# Patient Record
Sex: MALE | Race: WHITE | NOT HISPANIC OR LATINO
[De-identification: names, ages, dates, MRNs, and addresses within clinical notes are randomized per-mention and may not be internally consistent; named-entity substitution may affect disease eponyms.]

---

## 2021-08-30 ENCOUNTER — APPOINTMENT (OUTPATIENT)
Dept: GASTROENTEROLOGY | Facility: CLINIC | Age: 26
End: 2021-08-30
Payer: COMMERCIAL

## 2021-08-30 VITALS
BODY MASS INDEX: 41.75 KG/M2 | SYSTOLIC BLOOD PRESSURE: 110 MMHG | WEIGHT: 315 LBS | DIASTOLIC BLOOD PRESSURE: 60 MMHG | TEMPERATURE: 97.8 F | HEIGHT: 73 IN | OXYGEN SATURATION: 98 % | HEART RATE: 83 BPM

## 2021-08-30 DIAGNOSIS — Z78.9 OTHER SPECIFIED HEALTH STATUS: ICD-10-CM

## 2021-08-30 DIAGNOSIS — Z72.0 TOBACCO USE: ICD-10-CM

## 2021-08-30 DIAGNOSIS — Z82.49 FAMILY HISTORY OF ISCHEMIC HEART DISEASE AND OTHER DISEASES OF THE CIRCULATORY SYSTEM: ICD-10-CM

## 2021-08-30 DIAGNOSIS — Z80.7 FAMILY HISTORY OF OTHER MALIGNANT NEOPLASMS OF LYMPHOID, HEMATOPOIETIC AND RELATED TISSUES: ICD-10-CM

## 2021-08-30 PROCEDURE — 99203 OFFICE O/P NEW LOW 30 MIN: CPT

## 2021-08-30 RX ORDER — METRONIDAZOLE 250 MG/1
250 TABLET ORAL EVERY 6 HOURS
Qty: 40 | Refills: 0 | Status: COMPLETED | COMMUNITY
Start: 2021-08-30 | End: 2021-09-09

## 2021-08-30 NOTE — REASON FOR VISIT
[Initial Evaluation] : an initial evaluation [FreeTextEntry1] : Left upper quadrant abdominal pain and diarrhea

## 2021-08-30 NOTE — REVIEW OF SYSTEMS
[Abdominal Pain] : abdominal pain [Vomiting] : no vomiting [Constipation] : no constipation [Diarrhea] : diarrhea [Heartburn] : heartburn [Negative] : Heme/Lymph

## 2021-08-30 NOTE — ASSESSMENT
[FreeTextEntry1] : Postinfectious irritable bowel syndrome.  Diarrhea may be related to amoxicillin.  Chronic heartburn with intermittent dysphagia suspected reflux esophagitis possible gastritis.  Patient is started on omeprazole 40 mg a day for 2-month.  Dicyclomine 20 mg twice a day for control of the left upper quadrant pain.  Metronidazole 250 mg 4 times a day for 10 days.  Patient will see me in 2 weeks if he still have the diarrhea then will do the blood work-up and stool examination for causes of chronic diarrhea.  Same way if he still complained of dysphagia then he will need upper endoscopy.

## 2021-08-30 NOTE — PHYSICAL EXAM
[General Appearance - Alert] : alert [General Appearance - In No Acute Distress] : in no acute distress [FreeTextEntry1] : Obese [Sclera] : the sclera and conjunctiva were normal [Extraocular Movements] : extraocular movements were intact [PERRL With Normal Accommodation] : pupils were equal in size, round, and reactive to light [Outer Ear] : the ears and nose were normal in appearance [Oropharynx] : the oropharynx was normal [Neck Appearance] : the appearance of the neck was normal [Neck Cervical Mass (___cm)] : no neck mass was observed [Jugular Venous Distention Increased] : there was no jugular-venous distention [Thyroid Diffuse Enlargement] : the thyroid was not enlarged [Thyroid Nodule] : there were no palpable thyroid nodules [Heart Rate And Rhythm] : heart rate was normal and rhythm regular [Auscultation Breath Sounds / Voice Sounds] : lungs were clear to auscultation bilaterally [Heart Sounds] : normal S1 and S2 [Heart Sounds Gallop] : no gallops [Murmurs] : no murmurs [Heart Sounds Pericardial Friction Rub] : no pericardial rub [Full Pulse] : the pedal pulses are present [Edema] : there was no peripheral edema [Breast Appearance] : normal in appearance [Breast Palpation Mass] : no palpable masses [Bowel Sounds] : normal bowel sounds [Abdomen Soft] : soft [Abdomen Tenderness] : non-tender [Abdomen Mass (___ Cm)] : no abdominal mass palpated [Cervical Lymph Nodes Enlarged Posterior Bilaterally] : posterior cervical [Cervical Lymph Nodes Enlarged Anterior Bilaterally] : anterior cervical [Axillary Lymph Nodes Enlarged Bilaterally] : axillary [Supraclavicular Lymph Nodes Enlarged Bilaterally] : supraclavicular [Femoral Lymph Nodes Enlarged Bilaterally] : femoral [Inguinal Lymph Nodes Enlarged Bilaterally] : inguinal [No CVA Tenderness] : no ~M costovertebral angle tenderness [No Spinal Tenderness] : no spinal tenderness [Abnormal Walk] : normal gait [Nail Clubbing] : no clubbing  or cyanosis of the fingernails [Musculoskeletal - Swelling] : no joint swelling seen [Motor Tone] : muscle strength and tone were normal [Skin Color & Pigmentation] : normal skin color and pigmentation [Skin Turgor] : normal skin turgor [] : no rash [Deep Tendon Reflexes (DTR)] : deep tendon reflexes were 2+ and symmetric [No Focal Deficits] : no focal deficits [Sensation] : the sensory exam was normal to light touch and pinprick [Oriented To Time, Place, And Person] : oriented to person, place, and time [Impaired Insight] : insight and judgment were intact [Affect] : the affect was normal

## 2021-08-30 NOTE — HISTORY OF PRESENT ILLNESS
[Nausea] : denies nausea [Constipation] : denies constipation [Yellow Skin Or Eyes (Jaundice)] : denies jaundice [Abdominal Swelling] : denies abdominal swelling [Rectal Pain] : denies rectal pain [Diarrhea] : diarrhea [Heartburn] : heartburn [Abdominal Pain] : abdominal pain [GERD] : gastroesophageal reflux disease [Wt Gain ___ Lbs] : no recent weight gain [Wt Loss ___ Lbs] : no recent weight loss [Hiatus Hernia] : no hiatus hernia [Peptic Ulcer Disease] : no peptic ulcer disease [Cholelithiasis] : no cholelithiasis [Pancreatitis] : no pancreatitis [Kidney Stone] : no kidney stone [Inflammatory Bowel Disease] : no inflammatory bowel disease [Irritable Bowel Syndrome] : no irritable bowel syndrome [Diverticulitis] : no diverticulitis [Alcohol Abuse] : no alcohol abuse [Malignancy] : no malignancy [Abdominal Surgery] : no abdominal surgery [Appendectomy] : no appendectomy [Cholecystectomy] : no cholecystectomy [de-identified] : About 2 months ago patient developed burning pain in left upper abdomen bowel movements were normal after 2 to 3 weeks he felt better.  On August 6, 2021 he returned back from the [place] 2 days later he started having diarrhea with crampy left abdominal pain he was given antibiotic with the diarrhea improved but still going about twice a day and his stool is not normal still has crampy left abdominal pain.  It prompted him to come to see me.\par \par Chronic heartburn with intermittent difficulty in swallowing for about a 2 months.  Epigastric pain burning in nature also about 2 months again intermittent.  No unexplained weight loss. [FreeTextEntry1] : Patient denies any history of hypertension, diabetes mellitus, MI, angina, CHF, TIA, CVA, asthma, arrhythmia patient is obese.

## 2021-09-23 ENCOUNTER — LABORATORY RESULT (OUTPATIENT)
Age: 26
End: 2021-09-23

## 2021-09-23 ENCOUNTER — APPOINTMENT (OUTPATIENT)
Dept: GASTROENTEROLOGY | Facility: CLINIC | Age: 26
End: 2021-09-23
Payer: COMMERCIAL

## 2021-09-23 VITALS
BODY MASS INDEX: 41.75 KG/M2 | HEART RATE: 73 BPM | HEIGHT: 73 IN | WEIGHT: 315 LBS | OXYGEN SATURATION: 98 % | SYSTOLIC BLOOD PRESSURE: 134 MMHG | DIASTOLIC BLOOD PRESSURE: 82 MMHG | TEMPERATURE: 97.7 F

## 2021-09-23 DIAGNOSIS — K21.00 GASTRO-ESOPHAGEAL REFLUX DISEASE WITH ESOPHAGITIS, WITHOUT BLEEDING: ICD-10-CM

## 2021-09-23 DIAGNOSIS — Z87.898 PERSONAL HISTORY OF OTHER SPECIFIED CONDITIONS: ICD-10-CM

## 2021-09-23 DIAGNOSIS — Z00.00 ENCOUNTER FOR GENERAL ADULT MEDICAL EXAMINATION W/OUT ABNORMAL FINDINGS: ICD-10-CM

## 2021-09-23 PROCEDURE — 99214 OFFICE O/P EST MOD 30 MIN: CPT | Mod: 25

## 2021-09-23 PROCEDURE — 36415 COLL VENOUS BLD VENIPUNCTURE: CPT

## 2021-09-23 NOTE — PHYSICAL EXAM
[General Appearance - In No Acute Distress] : in no acute distress [General Appearance - Alert] : alert [FreeTextEntry1] : Obese [Sclera] : the sclera and conjunctiva were normal [PERRL With Normal Accommodation] : pupils were equal in size, round, and reactive to light [Extraocular Movements] : extraocular movements were intact [Outer Ear] : the ears and nose were normal in appearance [Oropharynx] : the oropharynx was normal [Neck Appearance] : the appearance of the neck was normal [Neck Cervical Mass (___cm)] : no neck mass was observed [Jugular Venous Distention Increased] : there was no jugular-venous distention [Thyroid Diffuse Enlargement] : the thyroid was not enlarged [Thyroid Nodule] : there were no palpable thyroid nodules [Auscultation Breath Sounds / Voice Sounds] : lungs were clear to auscultation bilaterally [Heart Rate And Rhythm] : heart rate was normal and rhythm regular [Heart Sounds] : normal S1 and S2 [Heart Sounds Gallop] : no gallops [Murmurs] : no murmurs [Heart Sounds Pericardial Friction Rub] : no pericardial rub [Full Pulse] : the pedal pulses are present [Edema] : there was no peripheral edema [Breast Palpation Mass] : no palpable masses [Breast Appearance] : normal in appearance [Bowel Sounds] : normal bowel sounds [Abdomen Soft] : soft [Abdomen Tenderness] : non-tender [Abdomen Mass (___ Cm)] : no abdominal mass palpated [Cervical Lymph Nodes Enlarged Posterior Bilaterally] : posterior cervical [Cervical Lymph Nodes Enlarged Anterior Bilaterally] : anterior cervical [Supraclavicular Lymph Nodes Enlarged Bilaterally] : supraclavicular [Femoral Lymph Nodes Enlarged Bilaterally] : femoral [Axillary Lymph Nodes Enlarged Bilaterally] : axillary [Inguinal Lymph Nodes Enlarged Bilaterally] : inguinal [No CVA Tenderness] : no ~M costovertebral angle tenderness [No Spinal Tenderness] : no spinal tenderness [Abnormal Walk] : normal gait [Nail Clubbing] : no clubbing  or cyanosis of the fingernails [Musculoskeletal - Swelling] : no joint swelling seen [Motor Tone] : muscle strength and tone were normal [Skin Color & Pigmentation] : normal skin color and pigmentation [Skin Turgor] : normal skin turgor [] : no rash [Deep Tendon Reflexes (DTR)] : deep tendon reflexes were 2+ and symmetric [No Focal Deficits] : no focal deficits [Sensation] : the sensory exam was normal to light touch and pinprick [Oriented To Time, Place, And Person] : oriented to person, place, and time [Impaired Insight] : insight and judgment were intact [Affect] : the affect was normal

## 2021-09-23 NOTE — HISTORY OF PRESENT ILLNESS
[Heartburn] : denies heartburn [Vomiting] : denies vomiting [Nausea] : denies nausea [Diarrhea] : denies diarrhea [Constipation] : denies constipation [Yellow Skin Or Eyes (Jaundice)] : denies jaundice [Abdominal Swelling] : denies abdominal swelling [Rectal Pain] : denies rectal pain [Abdominal Pain] : abdominal pain [Wt Gain ___ Lbs] : no recent weight gain [Wt Loss ___ Lbs] : no recent weight loss [GERD] : no gastroesophageal reflux disease [Hiatus Hernia] : no hiatus hernia [Peptic Ulcer Disease] : no peptic ulcer disease [Pancreatitis] : no pancreatitis [Cholelithiasis] : no cholelithiasis [Inflammatory Bowel Disease] : no inflammatory bowel disease [Irritable Bowel Syndrome] : no irritable bowel syndrome [Diverticulitis] : no diverticulitis [Alcohol Abuse] : no alcohol abuse [Malignancy] : no malignancy [Abdominal Surgery] : no abdominal surgery [Appendectomy] : no appendectomy [Cholecystectomy] : no cholecystectomy [de-identified] : Left upper quadrant, localized burning pain.  Without any radiation to the back chest or lower abdomen.  Pain is intermittent no relieving or aggravating factor no relationship to the bowel movement.  No relationship to the food.  No dysuria or hematuria no history of fever or chills.  There is no relationship of this pain to deep breath or change in the position.  Patient denies any history of shingles.  He does give history of splenomegaly and renal infection and abnormal LFT few years ago the etiology of which was not clear.\par \par At present he denies any heartburn or dysphagia or epigastric pain or melena.  No unexplained weight loss.  His bowel movements are normal once a day or twice a day mostly normal formed stool at days the stool is loose.  The crampy abdominal pain and diarrhea he had has subsided.  Since on omeprazole he no longer have heartburn or dysphagia. [FreeTextEntry1] : No chronic illness

## 2021-09-23 NOTE — ASSESSMENT
[FreeTextEntry1] : Persistent left upper quadrant abdominal pain described as burning in nature intermittent very localized.  Physical examination is normal this pain has been present for last 14 months or so.\par \par His acute problems of gastroenteritis and reflux has improved on the medication and no longer have symptoms of GERD or acute gastroenteritis.  The left upper quadrant pain has not responded to the dicyclomine or omeprazole.  The nature of the symptoms are more suggestive of pain from neuritis.  Am requesting a CT abdomen and pelvis with p.o. and IV contrast to evaluate other possible causes of pain like pancreatic pathology, splenic infarct, and renal pathology.  Patient is started on amitriptyline 10 mg at bedtime for possible neuritis as the cause of pain.  Blood work-up, CBC, CMP, lipid profile, hemoglobin A1c and thyroid function and celiac markers were requested for health maintenance and to see if any etiology for the pain could be found.  I will see the patient in follow-up in 3 weeks to see the result of all this test and response to the treatment.

## 2021-09-23 NOTE — REASON FOR VISIT
"Chief Complaint   Patient presents with     Ent Problem     c/o swelling at left parotid gland  for about a week now- denies pain      History of Present Illness  Raji Jain is a 17 year old male who presents today for evaluation.  The patient reports developing some fullness/discoloration in the preauricular area about 1 week ago.  He may have cut himself shaving in the area around the time that this started.  Denies any redness or fluctuance.  Has no constitutional symptoms.  No pain in the area.  Mom feels like it slowly improving.  Mom did show me a picture of some discoloration area this seems to be improving.  The patient denies any facial numbness, weakness, history of skin cancer or any cancer.  The mass does not change with eating.  Denies dysphagia, odynophagia, pharyngodynia, otalgia, dysphonia, hemoptysis, other neck lumps, bumps, swelling, or unintentional weight loss.  The patient is a non-smoker.    Past Medical History  Patient Active Problem List   Diagnosis     Pes planus     Eczema     Wide-complex tachycardia (H)     Ventricular tachycardia (H)     SVT (supraventricular tachycardia) (H)     Vegetarian     Current Medications     Current Outpatient Medications:      Ferrous Sulfate (IRON PO), 1 tablet \" periodically \", Disp: , Rfl:      MULTIPLE VITAMIN CHEW   OR, one daily, Disp: , Rfl: 0    Allergies  No Known Allergies    Social History   Social History     Socioeconomic History     Marital status: Single     Spouse name: Not on file     Number of children: Not on file     Years of education: Not on file     Highest education level: Not on file   Occupational History     Not on file   Social Needs     Financial resource strain: Not on file     Food insecurity     Worry: Not on file     Inability: Not on file     Transportation needs     Medical: Not on file     Non-medical: Not on file   Tobacco Use     Smoking status: Never Smoker     Smokeless tobacco: Never Used     Tobacco comment: " No exposure   Substance and Sexual Activity     Alcohol use: No     Drug use: No     Sexual activity: Never   Lifestyle     Physical activity     Days per week: Not on file     Minutes per session: Not on file     Stress: Not on file   Relationships     Social connections     Talks on phone: Not on file     Gets together: Not on file     Attends [employer] service: Not on file     Active member of club or organization: Not on file     Attends meetings of clubs or organizations: Not on file     Relationship status: Not on file     Intimate partner violence     Fear of current or ex partner: Not on file     Emotionally abused: Not on file     Physically abused: Not on file     Forced sexual activity: Not on file   Other Topics Concern     Not on file   Social History Narrative     Not on file       Family History  Family History   Problem Relation Age of Onset     Heart Disease Maternal Grandfather      Diabetes Maternal Grandfather        Review of Systems  As per HPI and PMHx, otherwise 10+ comprehensive system review is negative.    Physical Exam  /61 (BP Location: Right arm, Patient Position: Chair, Cuff Size: Adult Regular)   Pulse 75   Temp 97.4  F (36.3  C) (Oral)   Wt 71.7 kg (158 lb)   BMI 22.04 kg/m    GENERAL: Patient is a pleasant, cooperative 17 year old male in no acute distress.  HEAD: Normocephalic, atraumatic.  Hair and scalp are normal.  EYES: Pupils are equal, round, reactive to light and accommodation.  Extraocular movements are intact.  The sclera nonicteric without injection.  The extraocular structures are normal.  EARS: Normal shape and symmetry.  No tenderness when palpating the mastoid or tragal areas bilaterally.  Patient does have some blackheads and possible bilaterally.   NOSE: Nares are patent.  Nasal mucosa is moist.  Negative anterior rhinoscopy.  ORAL CAVITY: Dentition is in good repair.  Mucous membranes are moist.  Tongue is mobile, protrudes to the midline.  Palate  elevates symmetrically.  No oral cavity or oropharyngeal masses, lesions, ulcerations, leukoplakia.  Good salivary flow through Stensen's ducts bilaterally.  NECK: Supple, trachea is midline.  Palpation of left preauricular area reveals some firmness/slight induration with some slight elevation you have purple color change is in front of the ear.  It appears to be adherent to the overlying skin.  Clinically, I think this is separate from parotid gland.  This extends from the process of the zygomatic bone down toward the ear lobule.  No tenderness. Palpation of the occipital, submental, submandibular, internal jugular chain, and supraclavicular nodes does not demonstrate any abnormal lymph nodes or masses bilaterally.  Palpation of the right parotid and bilateral submandibular areas reveals no masses.  No thyromegaly.    NEUROLOGIC: Cranial nerves II through XII are grossly intact.  Voice is strong.  Patient is House-Brackmann I/VI bilaterally.  CARDIOVASCULAR: Extremities are warm and well-perfused.  No significant peripheral edema.  RESPIRATORY: Patient has nonlabored breathing without cough, wheeze, stridor.  PSYCHIATRIC: Patient is alert and oriented.  Mood and affect appear normal.  SKIN: Warm and dry.  No scalp, face, or neck lesions noted.    Assessment and Plan     ICD-10-CM    1. Lesion of face  L98.9 US Head Neck Soft Tissue     It was my pleasure seeing aRji Jain today in clinic.  The patient has a use fullness in the left preauricular area.  This is likely a skin process given that it feels attached to the skin and there was some color change.  We discussed ultrasound versus CT measuring.  Mom is more comfortable with ultrasound.  Most we will obtain an ultrasound of the area to look for any signs of skin cyst or vascular malformation.  This could help us decide if this is within the parotid or attached to the skin. Ultimately, if it continues to be symptomatic, this needs to be removed for  diagnosis and to prevent growth and complications.      We will contact the family with the ultrasound results when available.    Bill Ross MD  Department of Otolarygology-Head and Neck Surgery  [facility]     [Follow-Up: _____] : a [unfilled] follow-up visit [FreeTextEntry1] : Persistent left upper quadrant abdominal pain

## 2021-09-24 LAB
ALBUMIN SERPL ELPH-MCNC: 4.6 G/DL
ALP BLD-CCNC: 63 U/L
ALT SERPL-CCNC: 101 U/L
ANION GAP SERPL CALC-SCNC: 13 MMOL/L
AST SERPL-CCNC: 43 U/L
BASOPHILS # BLD AUTO: 0.04 K/UL
BASOPHILS NFR BLD AUTO: 0.6 %
BILIRUB SERPL-MCNC: 0.6 MG/DL
BUN SERPL-MCNC: 12 MG/DL
CALCIUM SERPL-MCNC: 9.6 MG/DL
CHLORIDE SERPL-SCNC: 105 MMOL/L
CHOLEST SERPL-MCNC: 175 MG/DL
CO2 SERPL-SCNC: 23 MMOL/L
CREAT SERPL-MCNC: 0.9 MG/DL
EOSINOPHIL # BLD AUTO: 0.15 K/UL
EOSINOPHIL NFR BLD AUTO: 2.4 %
ESTIMATED AVERAGE GLUCOSE: 97 MG/DL
GLUCOSE SERPL-MCNC: 101 MG/DL
HBA1C MFR BLD HPLC: 5 %
HCT VFR BLD CALC: 48.7 %
HDLC SERPL-MCNC: 34 MG/DL
HGB BLD-MCNC: 15.9 G/DL
IMM GRANULOCYTES NFR BLD AUTO: 0.3 %
LDLC SERPL CALC-MCNC: 120 MG/DL
LYMPHOCYTES # BLD AUTO: 1.78 K/UL
LYMPHOCYTES NFR BLD AUTO: 28 %
MAN DIFF?: NORMAL
MCHC RBC-ENTMCNC: 29.2 PG
MCHC RBC-ENTMCNC: 32.6 GM/DL
MCV RBC AUTO: 89.4 FL
MONOCYTES # BLD AUTO: 0.8 K/UL
MONOCYTES NFR BLD AUTO: 12.6 %
NEUTROPHILS # BLD AUTO: 3.56 K/UL
NEUTROPHILS NFR BLD AUTO: 56.1 %
NONHDLC SERPL-MCNC: 140 MG/DL
PLATELET # BLD AUTO: 264 K/UL
POTASSIUM SERPL-SCNC: 4.3 MMOL/L
PROT SERPL-MCNC: 7.3 G/DL
RBC # BLD: 5.45 M/UL
RBC # FLD: 14 %
SODIUM SERPL-SCNC: 141 MMOL/L
T4 FREE SERPL-MCNC: 1.3 NG/DL
TRIGL SERPL-MCNC: 101 MG/DL
TSH SERPL-ACNC: 1.59 UIU/ML
WBC # FLD AUTO: 6.35 K/UL

## 2021-09-27 LAB — CELIACPAN: NORMAL

## 2021-10-05 ENCOUNTER — APPOINTMENT (OUTPATIENT)
Dept: CT IMAGING | Facility: CLINIC | Age: 26
End: 2021-10-05
Payer: COMMERCIAL

## 2021-10-05 ENCOUNTER — OUTPATIENT (OUTPATIENT)
Dept: OUTPATIENT SERVICES | Facility: HOSPITAL | Age: 26
LOS: 1 days | End: 2021-10-05
Payer: COMMERCIAL

## 2021-10-05 DIAGNOSIS — Z00.00 ENCOUNTER FOR GENERAL ADULT MEDICAL EXAMINATION WITHOUT ABNORMAL FINDINGS: ICD-10-CM

## 2021-10-05 PROCEDURE — 74177 CT ABD & PELVIS W/CONTRAST: CPT

## 2021-10-05 PROCEDURE — 74177 CT ABD & PELVIS W/CONTRAST: CPT | Mod: 26

## 2021-10-11 ENCOUNTER — APPOINTMENT (OUTPATIENT)
Dept: GASTROENTEROLOGY | Facility: CLINIC | Age: 26
End: 2021-10-11
Payer: COMMERCIAL

## 2021-10-11 VITALS
BODY MASS INDEX: 41.75 KG/M2 | TEMPERATURE: 97.7 F | WEIGHT: 315 LBS | OXYGEN SATURATION: 99 % | HEART RATE: 97 BPM | HEIGHT: 73 IN | SYSTOLIC BLOOD PRESSURE: 128 MMHG | DIASTOLIC BLOOD PRESSURE: 80 MMHG

## 2021-10-11 PROCEDURE — 99213 OFFICE O/P EST LOW 20 MIN: CPT

## 2021-10-11 NOTE — REVIEW OF SYSTEMS
[Abdominal Pain] : abdominal pain [Vomiting] : no vomiting [Constipation] : no constipation [Diarrhea] : no diarrhea [Heartburn] : no heartburn [Melena] : no melena [Negative] : Heme/Lymph

## 2021-10-11 NOTE — PHYSICAL EXAM
[General Appearance - Alert] : alert [General Appearance - In No Acute Distress] : in no acute distress [Sclera] : the sclera and conjunctiva were normal [PERRL With Normal Accommodation] : pupils were equal in size, round, and reactive to light [Extraocular Movements] : extraocular movements were intact [Outer Ear] : the ears and nose were normal in appearance [Oropharynx] : the oropharynx was normal [Neck Appearance] : the appearance of the neck was normal [Neck Cervical Mass (___cm)] : no neck mass was observed [Jugular Venous Distention Increased] : there was no jugular-venous distention [Thyroid Diffuse Enlargement] : the thyroid was not enlarged [Thyroid Nodule] : there were no palpable thyroid nodules [Auscultation Breath Sounds / Voice Sounds] : lungs were clear to auscultation bilaterally [Heart Rate And Rhythm] : heart rate was normal and rhythm regular [Heart Sounds] : normal S1 and S2 [Heart Sounds Gallop] : no gallops [Murmurs] : no murmurs [Heart Sounds Pericardial Friction Rub] : no pericardial rub [Full Pulse] : the pedal pulses are present [Edema] : there was no peripheral edema [Breast Appearance] : normal in appearance [Breast Palpation Mass] : no palpable masses [Bowel Sounds] : normal bowel sounds [Abdomen Tenderness] : non-tender [Abdomen Soft] : soft [Abdomen Mass (___ Cm)] : no abdominal mass palpated [FreeTextEntry1] : Truncal obesity [Cervical Lymph Nodes Enlarged Posterior Bilaterally] : posterior cervical [Cervical Lymph Nodes Enlarged Anterior Bilaterally] : anterior cervical [Supraclavicular Lymph Nodes Enlarged Bilaterally] : supraclavicular [Axillary Lymph Nodes Enlarged Bilaterally] : axillary [Femoral Lymph Nodes Enlarged Bilaterally] : femoral [No CVA Tenderness] : no ~M costovertebral angle tenderness [Inguinal Lymph Nodes Enlarged Bilaterally] : inguinal [No Spinal Tenderness] : no spinal tenderness [Abnormal Walk] : normal gait [Nail Clubbing] : no clubbing  or cyanosis of the fingernails [Musculoskeletal - Swelling] : no joint swelling seen [Motor Tone] : muscle strength and tone were normal [Skin Color & Pigmentation] : normal skin color and pigmentation [Skin Turgor] : normal skin turgor [] : no rash [Deep Tendon Reflexes (DTR)] : deep tendon reflexes were 2+ and symmetric [Sensation] : the sensory exam was normal to light touch and pinprick [No Focal Deficits] : no focal deficits [Oriented To Time, Place, And Person] : oriented to person, place, and time [Impaired Insight] : insight and judgment were intact [Affect] : the affect was normal

## 2021-10-11 NOTE — ASSESSMENT
[FreeTextEntry1] : Abnormal liver function test most likely from steatohepatitis.  Patient counseled on diet and advised to exercise, walk 45 minutes a day.  And cut down on the carbohydrates.  Other etiology for abnormal liver function tests considered and blood work-up requested to make sure he is not exposed to hepatitis B or C and there is no evidence of autoimmune hepatitis.  We will keep the present dose of amitriptyline 10 mg at bedtime.  If there is no response to it in the next 2 weeks then increase the doses to 25 mg at bedtime.  Patient will be reevaluated in 2 to 3 weeks by that time all the work-up requested for chronic liver disease will be back.  In response to the amitriptyline will be completely normal.

## 2021-10-11 NOTE — HISTORY OF PRESENT ILLNESS
[Heartburn] : denies heartburn [Nausea] : denies nausea [Vomiting] : denies vomiting [Diarrhea] : denies diarrhea [Constipation] : denies constipation [Yellow Skin Or Eyes (Jaundice)] : denies jaundice [Abdominal Swelling] : denies abdominal swelling [Rectal Pain] : denies rectal pain [Abdominal Pain] : abdominal pain [GERD] : gastroesophageal reflux disease [Hiatus Hernia] : no hiatus hernia [Peptic Ulcer Disease] : no peptic ulcer disease [Pancreatitis] : no pancreatitis [Cholelithiasis] : no cholelithiasis [Kidney Stone] : no kidney stone [Inflammatory Bowel Disease] : no inflammatory bowel disease [Irritable Bowel Syndrome] : no irritable bowel syndrome [Diverticulitis] : no diverticulitis [Alcohol Abuse] : no alcohol abuse [Malignancy] : no malignancy [Appendectomy] : no appendectomy [Cholecystectomy] : no cholecystectomy [de-identified] : Patient has been having left upper quadrant pain for several months is burning in nature.  He also had symptoms of gastroesophageal reflux disease.  No relationship of this pain to the bowel movements or food patient was started on omeprazole 40 mg a day it controls the symptoms of his GERD but did not help the burning pain in the left upper quadrant of abdomen.  A CT abdomen was done to further evaluate the cause of this pain except for the fatty liver CT abdomen was normal and did not show any cause of pain, his spleen pancreas and kidneys were all normal.  His bowel movements are normal without any recent change.  The blood work-up revealed normal thyroid functions normal CBC there was no evidence of diabetes mellitus lipid profile shows elevated LDL mildly and low HDL again mildly his celiac markers were negative his liver function tests were abnormal with SGOT and SGPT elevated.  SGPT twice the SGOT.  Total protein albumin levels normal platelet counts were normal\par \par Abdominal pain was thought to be secondary to neuritis and is started on amitriptyline 10 mg at bedtime there is a mild improvement in the pain but he has taken it only for the last 2 weeks.  I will keep it another week before I increase the dose to 25 mg at bedtime. [FreeTextEntry1] : Patient is obese with hyperlipidemia and fatty liver which is most likely cause of his abnormal LFT but other causes of abnormal LFT need to be further worked up.  He denies any history of hypertension, diabetes mellitus, MI, angina, CHF, TIA, CVA.  And oral surgery for extraction of the wisdom teeth tonsillectomy.

## 2021-10-11 NOTE — REASON FOR VISIT
[Follow-Up: _____] : a [unfilled] follow-up visit [FreeTextEntry1] : Left upper quadrant abdominal pain, abnormal liver function test

## 2021-10-12 LAB
FERRITIN SERPL-MCNC: 526 NG/ML
HBV CORE IGG+IGM SER QL: NONREACTIVE
HBV CORE IGM SER QL: NONREACTIVE
HBV SURFACE AB SER QL: REACTIVE
HBV SURFACE AG SER QL: NONREACTIVE

## 2021-10-13 LAB
MITOCHONDRIA AB SER IF-ACNC: NORMAL
SMOOTH MUSCLE AB SER QL IF: ABNORMAL

## 2021-10-14 LAB
A1AT PHENOTYP SERPL-IMP: NORMAL
A1AT SERPL-MCNC: 142 MG/DL
ANA PAT FLD IF-IMP: ABNORMAL
ANA SER IF-ACNC: ABNORMAL
FIBROSIS SCORE: 0.16
FIBROSIS STAGE: NORMAL
FIBROSURE ALPHA 2 MACROGLOBULINS: 224 MG/DL
FIBROSURE ALT (SGPT): 75 IU/L
FIBROSURE APOLIPOPROTEIN A1: 119 MG/DL
FIBROSURE COMMENT 2: NORMAL
FIBROSURE GGT: 27 IU/L
FIBROSURE HAPTOGLOBIN: 186 MG/DL
FIBROSURE INTERPRETATIONS: NORMAL
FIBROSURE LIMITATIONS: NORMAL
FIBROSURE NECROINFLAMM ACTIVITY SCORING: NORMAL
FIBROSURE NECROINFLAMMAT ACTIVITY GRPFIBROSURE NECROINFLAMMA: NORMAL
FIBROSURE NECROINFLAMMAT ACTIVITY SCORE: 0.4
FIBROSURE SCORING: NORMAL
FIBROSURE TOTAL BILIRUBIN: 0.5 MG/DL
HCV RNA FLD QL NAA+PROBE: NEGATIVE

## 2021-10-28 ENCOUNTER — RX RENEWAL (OUTPATIENT)
Age: 26
End: 2021-10-28

## 2021-10-28 DIAGNOSIS — K21.9 GASTRO-ESOPHAGEAL REFLUX DISEASE W/OUT ESOPHAGITIS: ICD-10-CM

## 2021-11-10 ENCOUNTER — APPOINTMENT (OUTPATIENT)
Dept: GASTROENTEROLOGY | Facility: CLINIC | Age: 26
End: 2021-11-10
Payer: COMMERCIAL

## 2021-11-10 VITALS
BODY MASS INDEX: 41.75 KG/M2 | HEART RATE: 100 BPM | OXYGEN SATURATION: 98 % | HEIGHT: 73 IN | WEIGHT: 315 LBS | DIASTOLIC BLOOD PRESSURE: 82 MMHG | SYSTOLIC BLOOD PRESSURE: 136 MMHG | TEMPERATURE: 97.1 F

## 2021-11-10 DIAGNOSIS — R10.12 LEFT UPPER QUADRANT PAIN: ICD-10-CM

## 2021-11-10 PROCEDURE — 99213 OFFICE O/P EST LOW 20 MIN: CPT

## 2021-11-10 NOTE — HISTORY OF PRESENT ILLNESS
[de-identified] : The left upper quadrant pain is better and now only intermittent on 10 mg of amitriptyline once a day.  Work-up for abnormal liver function test revealed #1 CT abdomen shows fatty in the liver.  #2 the antinuclear antibody is 1:320.smooth muscles antibody 1:40.  Ferritin level 550.  Hepatic necroinflammatory score is A1-A2 and fibrosis score is F0.  The main concern here just steatohepatitis versus autoimmune hepatitis.  Patient immunoglobulin levels were not elevated which I would expect to go up in autoimmune hepatitis.  Due to this concern I suggested patient to undergo liver biopsy to see whether we are dealing with a steatohepatitis or autoimmune hepatitis.  Patient was little concern from the possibility of bleeding in the liver from biopsy and was hesitant for it.  He was asked to get a second opinion with a pathologist and referred to see Dr. Pitts at [facility].  He was also explained that if he does not undergo the liver biopsy he should also see a rheumatologist to see if there is any other explanation for elevated antinuclear antibody which is a speckled pattern which may occur in mixed connective tissue disorder.  Clinically patient does not have a many features of mixed connective tissue disorder.  He denies any rash or joint pains.  Patient will continue with a low carbohydrate diet and daily walking for 45 minutes to 1 hour a day.  4 better control of his left upper quadrant pain will increase amitriptyline to 25 mg a day.  Once patient has decided to wait the liver biopsy or getting a second opinion he will call me and let me know.  Meanwhile continue to observe liver function test every 3 months or so.  To see if the picture become more clear regarding autoimmune hepatitis with elevation of immunoglobulin G.

## 2021-11-10 NOTE — REASON FOR VISIT
[Follow-Up: _____] : a [unfilled] follow-up visit [FreeTextEntry1] : Left upper quadrant abdominal pain.  Abnormal liver function test.

## 2022-01-03 ENCOUNTER — APPOINTMENT (OUTPATIENT)
Dept: HEPATOLOGY | Facility: CLINIC | Age: 27
End: 2022-01-03

## 2022-02-28 ENCOUNTER — APPOINTMENT (OUTPATIENT)
Dept: HEPATOLOGY | Facility: CLINIC | Age: 27
End: 2022-02-28
Payer: COMMERCIAL

## 2022-02-28 ENCOUNTER — LABORATORY RESULT (OUTPATIENT)
Age: 27
End: 2022-02-28

## 2022-02-28 VITALS
OXYGEN SATURATION: 98 % | RESPIRATION RATE: 16 BRPM | TEMPERATURE: 97.5 F | DIASTOLIC BLOOD PRESSURE: 100 MMHG | SYSTOLIC BLOOD PRESSURE: 153 MMHG | HEIGHT: 72 IN | WEIGHT: 315 LBS | HEART RATE: 95 BPM | BODY MASS INDEX: 42.66 KG/M2

## 2022-02-28 VITALS — DIASTOLIC BLOOD PRESSURE: 81 MMHG | SYSTOLIC BLOOD PRESSURE: 145 MMHG

## 2022-02-28 LAB
BASOPHILS # BLD AUTO: 0.06 K/UL
BASOPHILS NFR BLD AUTO: 0.9 %
CERULOPLASMIN SERPL-MCNC: 19 MG/DL
DEPRECATED KAPPA LC FREE/LAMBDA SER: 0.74 RATIO
EOSINOPHIL # BLD AUTO: 0.18 K/UL
EOSINOPHIL NFR BLD AUTO: 2.6 %
ESTIMATED AVERAGE GLUCOSE: 100 MG/DL
HBA1C MFR BLD HPLC: 5.1 %
HBV CORE IGG+IGM SER QL: NONREACTIVE
HBV SURFACE AB SER QL: REACTIVE
HBV SURFACE AG SER QL: NONREACTIVE
HCT VFR BLD CALC: 52 %
HCV AB SER QL: NONREACTIVE
HCV S/CO RATIO: 0.11 S/CO
HEPATITIS A IGG ANTIBODY: NONREACTIVE
HGB BLD-MCNC: 17.2 G/DL
IGA SER QL IEP: 118 MG/DL
IGG SER QL IEP: 1201 MG/DL
IGM SER QL IEP: 60 MG/DL
IMM GRANULOCYTES NFR BLD AUTO: 0.4 %
INR PPP: 0.95 RATIO
KAPPA LC CSF-MCNC: 3.27 MG/DL
KAPPA LC SERPL-MCNC: 2.41 MG/DL
LYMPHOCYTES # BLD AUTO: 2.17 K/UL
LYMPHOCYTES NFR BLD AUTO: 31.5 %
MAN DIFF?: NORMAL
MCHC RBC-ENTMCNC: 28.8 PG
MCHC RBC-ENTMCNC: 33.1 GM/DL
MCV RBC AUTO: 87.1 FL
MONOCYTES # BLD AUTO: 0.72 K/UL
MONOCYTES NFR BLD AUTO: 10.5 %
NEUTROPHILS # BLD AUTO: 3.72 K/UL
NEUTROPHILS NFR BLD AUTO: 54.1 %
PLATELET # BLD AUTO: 246 K/UL
PT BLD: 11 SEC
RBC # BLD: 5.97 M/UL
RBC # FLD: 13.2 %
WBC # FLD AUTO: 6.88 K/UL

## 2022-02-28 PROCEDURE — 99214 OFFICE O/P EST MOD 30 MIN: CPT

## 2022-02-28 RX ORDER — OMEPRAZOLE 40 MG/1
40 CAPSULE, DELAYED RELEASE ORAL
Qty: 30 | Refills: 1 | Status: DISCONTINUED | COMMUNITY
Start: 2021-08-30 | End: 2022-02-28

## 2022-02-28 RX ORDER — DICYCLOMINE HYDROCHLORIDE 20 MG/1
20 TABLET ORAL
Qty: 60 | Refills: 2 | Status: DISCONTINUED | COMMUNITY
Start: 2021-08-30 | End: 2022-02-28

## 2022-02-28 RX ORDER — AMITRIPTYLINE HYDROCHLORIDE 10 MG/1
10 TABLET, FILM COATED ORAL
Qty: 30 | Refills: 5 | Status: DISCONTINUED | COMMUNITY
Start: 2021-09-23 | End: 2022-02-28

## 2022-02-28 RX ORDER — AMOXICILLIN 875 MG/1
TABLET, FILM COATED ORAL
Refills: 0 | Status: DISCONTINUED | COMMUNITY
End: 2022-02-28

## 2022-02-28 RX ORDER — AMITRIPTYLINE HYDROCHLORIDE 25 MG/1
25 TABLET, FILM COATED ORAL
Qty: 30 | Refills: 5 | Status: DISCONTINUED | COMMUNITY
Start: 2021-11-10 | End: 2022-02-28

## 2022-02-28 NOTE — PHYSICAL EXAM
[General Appearance - Alert] : alert [General Appearance - In No Acute Distress] : in no acute distress [Sclera] : the sclera and conjunctiva were normal [PERRL With Normal Accommodation] : pupils were equal in size, round, and reactive to light [Extraocular Movements] : extraocular movements were intact [Neck Appearance] : the appearance of the neck was normal [Neck Cervical Mass (___cm)] : no neck mass was observed [Jugular Venous Distention Increased] : there was no jugular-venous distention [Thyroid Diffuse Enlargement] : the thyroid was not enlarged [Thyroid Nodule] : there were no palpable thyroid nodules [Auscultation Breath Sounds / Voice Sounds] : lungs were clear to auscultation bilaterally [Heart Rate And Rhythm] : heart rate was normal and rhythm regular [Heart Sounds] : normal S1 and S2 [Heart Sounds Gallop] : no gallops [Murmurs] : no murmurs [Heart Sounds Pericardial Friction Rub] : no pericardial rub [Bowel Sounds] : normal bowel sounds [Abdomen Soft] : soft [Abdomen Tenderness] : non-tender [Abdomen Mass (___ Cm)] : no abdominal mass palpated [No CVA Tenderness] : no ~M costovertebral angle tenderness [No Spinal Tenderness] : no spinal tenderness [Abnormal Walk] : normal gait [Nail Clubbing] : no clubbing  or cyanosis of the fingernails [Musculoskeletal - Swelling] : no joint swelling seen [Motor Tone] : muscle strength and tone were normal [Skin Color & Pigmentation] : normal skin color and pigmentation [Skin Turgor] : normal skin turgor [] : no rash [Oriented To Time, Place, And Person] : oriented to person, place, and time [Impaired Insight] : insight and judgment were intact [Affect] : the affect was normal [Scleral Icterus] : No Scleral Icterus [Spider Angioma] : No spider angioma(s) were observed [Abdominal  Ascites] : no ascites [Ascites Fluid Wave] : no ascites fluid wave [Ascites Tense] : ascites is not tense [Asterixis] : no asterixis observed [Jaundice] : No jaundice [Depression] : no depression [Hallucinations] : ~T no ~M hallucinations

## 2022-02-28 NOTE — ASSESSMENT
[FreeTextEntry1] : 25 y/o M w/ a hx of morbid obesity, NAFLD here for concern of AIH vs VERA. He has had elevated liver enzymes since about 9150-7376. \par \par # Elevated liver enzymes\par Suspect VERA unlikely AIH given pattern and clinical presentation\par Elevated DOM and even ASMA can be found in VERA\par Check hemochromatosis\par check ceruloplasmin \par check CARMELITA deficiency\par Hold off on biopsy unless testing suggests fibrosis\par Fibroscan ordered\par Needs to lose at least 30-40 pounds over 1-2 years\par \par \par RTC 3 weeks

## 2022-02-28 NOTE — HISTORY OF PRESENT ILLNESS
[de-identified] : 27 y/o M w/ a hx of morbid obesity, NAFLD here for concern of AIH vs VERA. He has had elevated liver enzymes since about 6694-2549. \par \par During workup in 2021 for LUQ abdominal pain, he was found to have positive DOM, ASMA, elevated ferritin. He underwent a Fibrosure test in 10/2021 which showed F0, A1-A2.\par \par 10/2021 CT - fatty infiltration.\par \par PSH: Back surgery 10/2020\par Allergies: Cephalosporin\par FH: Sister with mixed connective tissue disease. Other sister may lupus. Mother has psoriasis. Maternal aunt with RA. \par SH: No cigarette use, vapes daily. Drinks 2-3 drinks 1-2x monthly. works for [employer]\par \par 2/28/22 visit - He reports he started feeling unwell after coming home from a trip of [place] in August. Notes that he had chills, N/V, diarrhea, fevers. Reports his diarrhea persisted for 3-4 weeks. Was given abx by his primary GI which helped his symptoms. Also notes he has been having LUQ pain twice weekly since the beginning of the pandemic. No fevers, chills, N/V, abdominal pain, weight loss. No herbal supplements. 100 pound weight gain since the age of 18. \par

## 2022-02-28 NOTE — CONSULT LETTER
[Dear  ___] : Dear  [unfilled], [Consult Letter:] : I had the pleasure of evaluating your patient, [unfilled]. [( Thank you for referring [unfilled] for consultation for _____ )] : Thank you for referring [unfilled] for consultation for [unfilled] [Please see my note below.] : Please see my note below. [Consult Closing:] : Thank you very much for allowing me to participate in the care of this patient.  If you have any questions, please do not hesitate to contact me. [Sincerely,] : Sincerely, [FreeTextEntry2] : Dr. Mike Laboy [FreeTextEntry3] : Dr. Rober Craft MD\par  of Medicine\par [facility]\par [facility]\par

## 2022-03-01 LAB
AFP-TM SERPL-MCNC: <1.8 NG/ML
ALBUMIN SERPL ELPH-MCNC: 4.6 G/DL
ALP BLD-CCNC: 57 U/L
ALT SERPL-CCNC: 59 U/L
ANION GAP SERPL CALC-SCNC: 15 MMOL/L
AST SERPL-CCNC: 31 U/L
BILIRUB SERPL-MCNC: 0.4 MG/DL
BUN SERPL-MCNC: 20 MG/DL
CALCIUM SERPL-MCNC: 9.4 MG/DL
CHLORIDE SERPL-SCNC: 105 MMOL/L
CHOLEST SERPL-MCNC: 222 MG/DL
CO2 SERPL-SCNC: 20 MMOL/L
CREAT SERPL-MCNC: 0.83 MG/DL
EGFR: 124 ML/MIN/1.73M2
FERRITIN SERPL-MCNC: 425 NG/ML
GLUCOSE SERPL-MCNC: 110 MG/DL
HDLC SERPL-MCNC: 45 MG/DL
IRON SATN MFR SERPL: 21 %
IRON SERPL-MCNC: 76 UG/DL
LDLC SERPL CALC-MCNC: 151 MG/DL
LKM AB SER QL IF: <20.1 UNITS
NONHDLC SERPL-MCNC: 177 MG/DL
POTASSIUM SERPL-SCNC: 4.5 MMOL/L
PROT SERPL-MCNC: 7.2 G/DL
SODIUM SERPL-SCNC: 140 MMOL/L
TIBC SERPL-MCNC: 362 UG/DL
TRIGL SERPL-MCNC: 133 MG/DL
TSH SERPL-ACNC: 1.53 UIU/ML
UIBC SERPL-MCNC: 286 UG/DL

## 2022-03-02 LAB
ANA SER IF-ACNC: NEGATIVE
SMOOTH MUSCLE AB SER QL IF: ABNORMAL
TM INTERPRETATION: NORMAL

## 2022-03-03 LAB — GGT SERPL-CCNC: 22 U/L

## 2022-03-04 LAB
A1AT PHENOTYP SERPL-IMP: NORMAL
A1AT SERPL-MCNC: 149 MG/DL

## 2022-03-14 LAB
LYSOSOMAL ACID LIPASE INTERPRETATION: NORMAL
LYSOSOMAL ACID LIPASE: 53 CD:384473389

## 2022-03-29 ENCOUNTER — APPOINTMENT (OUTPATIENT)
Dept: HEPATOLOGY | Facility: CLINIC | Age: 27
End: 2022-03-29
Payer: COMMERCIAL

## 2022-03-29 ENCOUNTER — TRANSCRIPTION ENCOUNTER (OUTPATIENT)
Age: 27
End: 2022-03-29

## 2022-03-29 VITALS
HEART RATE: 83 BPM | SYSTOLIC BLOOD PRESSURE: 129 MMHG | OXYGEN SATURATION: 97 % | WEIGHT: 315 LBS | HEIGHT: 72 IN | TEMPERATURE: 97.5 F | RESPIRATION RATE: 16 BRPM | BODY MASS INDEX: 42.66 KG/M2 | DIASTOLIC BLOOD PRESSURE: 89 MMHG

## 2022-03-29 PROCEDURE — 99214 OFFICE O/P EST MOD 30 MIN: CPT

## 2022-03-29 PROCEDURE — 91200 LIVER ELASTOGRAPHY: CPT

## 2022-03-29 NOTE — ASSESSMENT
[FreeTextEntry1] : 28 y/o M w/ a hx of morbid obesity, NAFLD here for concern of AIH vs VERA. He has had elevated liver enzymes since about 4424-5326. \par \par # Elevated liver enzymes\par Suspect VERA unlikely AIH given pattern and clinical presentation\par Elevated DOM and even ASMA can be found in VERA\par CARMELITA enzyme activity on the lower side. Will resend to [facility]. If still low then can send the invitae genetic testing later. Low CARMELITA activity that is borderline is non specific and can be seen just in the setting of fatty liver.\par Hold off on biopsy due to lack of evidence of fibrosis on Fibroscan today - , 6.7 kPa.\par Needs to lose at least 30-40 pounds over 1-2 years\par He does not want to try weight loss medications at this time.\par \par RTC 6 months. labs today\par

## 2022-03-29 NOTE — HISTORY OF PRESENT ILLNESS
[de-identified] : 26 y/o M w/ a hx of morbid obesity, NAFLD here for concern of AIH vs VERA. He has had elevated liver enzymes since about 6252-9047. \par \par During workup in 2021 for LUQ abdominal pain, he was found to have positive DOM, ASMA, elevated ferritin. He underwent a Fibrosure test in 10/2021 which showed F0, A1-A2.\par \par 10/2021 CT - fatty infiltration.\par \par PSH: Back surgery 10/2020\par Allergies: Cephalosporin\par FH: Sister with mixed connective tissue disease. Other sister may lupus. Mother has psoriasis. Maternal aunt with RA. \par SH: No cigarette use, vapes daily. Drinks 2-3 drinks 1-2x monthly. works for [email or web address]\par \par 2/28/22 visit - He reports he started feeling unwell after coming home from a trip of [place] in August. Notes that he had chills, N/V, diarrhea, fevers. Reports his diarrhea persisted for 3-4 weeks. Was given abx by his primary GI which helped his symptoms. Also notes he has been having LUQ pain twice weekly since the beginning of the pandemic. No fevers, chills, N/V, abdominal pain, weight loss. No herbal supplements. 100 pound weight gain since the age of 18. \par \par 3/29/22 - lost 18 pounds over a month. Fibroscan , 6.7 kPa. liver enzymes was already improving at initial appt. he is following a strict diet.\par  Skin normal color for race, warm, dry and intact. No evidence of rash.

## 2022-03-29 NOTE — CONSULT LETTER
[Dear  ___] : Dear  [unfilled], [Consult Letter:] : I had the pleasure of evaluating your patient, [unfilled]. [( Thank you for referring [unfilled] for consultation for _____ )] : Thank you for referring [unfilled] for consultation for [unfilled] [Please see my note below.] : Please see my note below. [Consult Closing:] : Thank you very much for allowing me to participate in the care of this patient.  If you have any questions, please do not hesitate to contact me. [Sincerely,] : Sincerely, [FreeTextEntry2] : Dr. Mike Laboy [FreeTextEntry3] : Dr. Rober Craft MD\par  of Medicine\par [facility]\par [facility]\par

## 2022-03-30 LAB
ALBUMIN SERPL ELPH-MCNC: 4.9 G/DL
ALP BLD-CCNC: 59 U/L
ALT SERPL-CCNC: 83 U/L
ANION GAP SERPL CALC-SCNC: 13 MMOL/L
AST SERPL-CCNC: 45 U/L
BILIRUB SERPL-MCNC: 1.3 MG/DL
BUN SERPL-MCNC: 9 MG/DL
CALCIUM SERPL-MCNC: 10 MG/DL
CHLORIDE SERPL-SCNC: 103 MMOL/L
CO2 SERPL-SCNC: 24 MMOL/L
CREAT SERPL-MCNC: 0.83 MG/DL
EGFR: 123 ML/MIN/1.73M2
GLUCOSE SERPL-MCNC: 84 MG/DL
POTASSIUM SERPL-SCNC: 4.5 MMOL/L
PROT SERPL-MCNC: 7.4 G/DL
SODIUM SERPL-SCNC: 141 MMOL/L

## 2022-04-06 LAB
MISCELLANEOUS TEST: NORMAL
PROC NAME: NORMAL

## 2022-10-10 ENCOUNTER — APPOINTMENT (OUTPATIENT)
Dept: HEPATOLOGY | Facility: CLINIC | Age: 27
End: 2022-10-10

## 2023-06-22 ENCOUNTER — APPOINTMENT (OUTPATIENT)
Dept: HEPATOLOGY | Facility: CLINIC | Age: 28
End: 2023-06-22
Payer: COMMERCIAL

## 2023-06-22 VITALS
OXYGEN SATURATION: 99 % | BODY MASS INDEX: 42.66 KG/M2 | WEIGHT: 315 LBS | HEIGHT: 72 IN | DIASTOLIC BLOOD PRESSURE: 83 MMHG | SYSTOLIC BLOOD PRESSURE: 142 MMHG | HEART RATE: 78 BPM

## 2023-06-22 DIAGNOSIS — R74.8 ABNORMAL LEVELS OF OTHER SERUM ENZYMES: ICD-10-CM

## 2023-06-22 PROCEDURE — 99214 OFFICE O/P EST MOD 30 MIN: CPT

## 2023-06-22 NOTE — ASSESSMENT
[FreeTextEntry1] : 29 y/o M w/ a hx of morbid obesity, NAFLD here for concern of AIH vs VERA. He has had elevated liver enzymes since about 6949-6403. \par \par # Elevated liver enzymes\par -Suspect VERA unlikely AIH given pattern and clinical presentation. Liver biopsy was deferred in the past due to lack of evidence of fibrosis on Fibroscan 3/29/23- , 6.7 kPa. Will get MRE to re-evaluate for fibrosis since his BMI is 46 and fibroscan test may not be accurate. \par Elevated DOM and even ASMA can be found in VERA\par -CARMELITA enzyme activity on the lower side with normal on repeat test 3/29/22. Low CARMELITA activity that is borderline is non specific and can be seen just in the setting of fatty liver.\par -He did not want to try weight loss medications in the past. I have encouraged a gradual weight loss of 10%. Encouraged maintaining a diet low in carbohydrates, fat and cholesterol, healthy fats (avocados and almonds), lean meats and whole grains. Advised to reduced portion size.  I recommended exercise.\par \par \par Plan:\par BW today, MRE, RTC 3-4 weeks\par

## 2023-06-22 NOTE — HISTORY OF PRESENT ILLNESS
[de-identified] : 28 y/o M w/ a hx of morbid obesity, NAFLD here for concern of AIH vs VERA. He has had elevated liver enzymes since about 3984-2644. \par \par During workup in 2021 for LUQ abdominal pain, he was found to have positive DOM, ASMA, elevated ferritin. He underwent a Fibrosure test in 10/2021 which showed F0, A1-A2.\par \par 10/2021 CT - fatty infiltration.\par \par PSH: Back surgery 10/2020\par Allergies: Cephalosporin\par FH: Sister with mixed connective tissue disease. Other sister may lupus. Mother has psoriasis. Maternal aunt with RA. \par SH: No cigarette use, vapes daily. Drinks 2-3 drinks 1-2x monthly. works for [employer]\par \par 2/28/22 visit - He reports he started feeling unwell after coming home from a trip of [place] in August. Notes that he had chills, N/V, diarrhea, fevers. Reports his diarrhea persisted for 3-4 weeks. Was given abx by his primary GI which helped his symptoms. Also notes he has been having LUQ pain twice weekly since the beginning of the pandemic. No fevers, chills, N/V, abdominal pain, weight loss. No herbal supplements. 100 pound weight gain since the age of 18. \par \par 3/29/22 - lost 18 pounds over a month. Fibroscan , 6.7 kPa. liver enzymes was already improving at initial appt. he is following a strict diet.\par \par 6/22/23 Last seen by Dr. Craft on 3/29/22. States that he recently had BW few weeks ago and his LFTs much higher than in the past, no report for review. States that he has gained about 35-40 lbs since last visit due to back pain and recently went on intermittent fasting/ eating less and lost 9 lbs in 1.5 week. He reports being able to loss weight in the past but gained right back after stopping his diet. States that he eats healthy but large portions. Not much exercise but very active since he works for [employer].

## 2023-06-22 NOTE — REVIEW OF SYSTEMS
[Fever] : no fever [Chills] : no chills [Chest Pain] : no chest pain [Cough] : no cough [Negative] : Heme/Lymph [FreeTextEntry9] : back pain

## 2023-06-23 LAB
ALBUMIN SERPL ELPH-MCNC: 4.8 G/DL
ALP BLD-CCNC: 65 U/L
ALT SERPL-CCNC: 66 U/L
ANION GAP SERPL CALC-SCNC: 13 MMOL/L
AST SERPL-CCNC: 36 U/L
BILIRUB SERPL-MCNC: 1.2 MG/DL
BUN SERPL-MCNC: 11 MG/DL
CALCIUM SERPL-MCNC: 9.9 MG/DL
CHLORIDE SERPL-SCNC: 104 MMOL/L
CO2 SERPL-SCNC: 24 MMOL/L
CREAT SERPL-MCNC: 0.94 MG/DL
EGFR: 113 ML/MIN/1.73M2
GGT SERPL-CCNC: 17 U/L
IGG SER QL IEP: 1113 MG/DL
INR PPP: 1.09 RATIO
POTASSIUM SERPL-SCNC: 4.4 MMOL/L
PROT SERPL-MCNC: 7.3 G/DL
PT BLD: 12.7 SEC
SODIUM SERPL-SCNC: 141 MMOL/L

## 2023-06-26 LAB — SMOOTH MUSCLE AB SER QL IF: ABNORMAL

## 2023-07-17 ENCOUNTER — RESULT REVIEW (OUTPATIENT)
Age: 28
End: 2023-07-17

## 2023-07-17 ENCOUNTER — APPOINTMENT (OUTPATIENT)
Dept: MRI IMAGING | Facility: CLINIC | Age: 28
End: 2023-07-17
Payer: COMMERCIAL

## 2023-07-17 ENCOUNTER — OUTPATIENT (OUTPATIENT)
Dept: OUTPATIENT SERVICES | Facility: HOSPITAL | Age: 28
LOS: 1 days | End: 2023-07-17
Payer: COMMERCIAL

## 2023-07-17 DIAGNOSIS — Z00.8 ENCOUNTER FOR OTHER GENERAL EXAMINATION: ICD-10-CM

## 2023-07-17 PROCEDURE — 76391 MR ELASTOGRAPHY: CPT | Mod: 26

## 2023-07-17 PROCEDURE — 76391 MR ELASTOGRAPHY: CPT

## 2023-07-17 PROCEDURE — 74181 MRI ABDOMEN W/O CONTRAST: CPT | Mod: 26

## 2023-07-17 PROCEDURE — 74181 MRI ABDOMEN W/O CONTRAST: CPT

## 2023-07-20 ENCOUNTER — APPOINTMENT (OUTPATIENT)
Dept: HEPATOLOGY | Facility: CLINIC | Age: 28
End: 2023-07-20
Payer: COMMERCIAL

## 2023-07-20 ENCOUNTER — NON-APPOINTMENT (OUTPATIENT)
Age: 28
End: 2023-07-20

## 2023-07-20 VITALS
HEART RATE: 79 BPM | OXYGEN SATURATION: 99 % | BODY MASS INDEX: 42.66 KG/M2 | SYSTOLIC BLOOD PRESSURE: 150 MMHG | DIASTOLIC BLOOD PRESSURE: 86 MMHG | HEIGHT: 72 IN | WEIGHT: 315 LBS

## 2023-07-20 DIAGNOSIS — K76.0 FATTY (CHANGE OF) LIVER, NOT ELSEWHERE CLASSIFIED: ICD-10-CM

## 2023-07-20 DIAGNOSIS — R79.89 OTHER SPECIFIED ABNORMAL FINDINGS OF BLOOD CHEMISTRY: ICD-10-CM

## 2023-07-20 PROCEDURE — 99214 OFFICE O/P EST MOD 30 MIN: CPT

## 2023-07-20 NOTE — ASSESSMENT
[FreeTextEntry1] : 27 y/o M w/ a hx of morbid obesity, NAFLD here for concern of AIH vs VERA. He has had elevated liver enzymes since about 4789-7077. \par \par # Elevated liver enzymes\par -Suspect VERA unlikely AIH given pattern and clinical presentation. Liver biopsy was deferred in the past due to lack of evidence of fibrosis on Fibroscan 3/29/23- , 6.7 kPa. MRE on 7/17/23 also showed no fibrosis. \par Elevated DOM and even ASMA can be found in VERA. Transaminases elevated but stable for many yrs. BW 6/22/23 ALT 66, AST 36, TB 1.2, ALP 65\par -CARMELITA enzyme activity on the lower side with normal on repeat test 3/29/22. Low CARMELITA activity that is borderline is non specific and can be seen just in the setting of fatty liver.\par -He does not want to try weight loss medications and feels that he can loss weight on his own. I have encouraged a gradual weight loss of 10%. Encouraged maintaining a diet low in carbohydrates, fat and cholesterol, healthy fats (avocados and almonds), lean meats and whole grains. Advised to reduced portion size.  I recommended exercise.\par \par Plan:\par RTC 3-4 months with repeat BW\par

## 2023-07-20 NOTE — HISTORY OF PRESENT ILLNESS
[de-identified] : 28 y/o M w/ a hx of morbid obesity, NAFLD here for concern of AIH vs VERA. He has had elevated liver enzymes since about 5768-9299. \par \par During workup in 2021 for LUQ abdominal pain, he was found to have positive DOM, ASMA, elevated ferritin. He underwent a Fibrosure test in 10/2021 which showed F0, A1-A2.\par \par 10/2021 CT - fatty infiltration.\par \par PSH: Back surgery 10/2020\par Allergies: Cephalosporin\par FH: Sister with mixed connective tissue disease. Other sister may lupus. Mother has psoriasis. Maternal aunt with RA. \par SH: No cigarette use, vapes daily. Drinks 2-3 drinks 1-2x monthly. works for [employer]\par \par 2/28/22 visit - He reports he started feeling unwell after coming home from a trip of [place] in August. Notes that he had chills, N/V, diarrhea, fevers. Reports his diarrhea persisted for 3-4 weeks. Was given abx by his primary GI which helped his symptoms. Also notes he has been having LUQ pain twice weekly since the beginning of the pandemic. No fevers, chills, N/V, abdominal pain, weight loss. No herbal supplements. 100 pound weight gain since the age of 18. \par \par 3/29/22 - lost 18 pounds over a month. Fibroscan , 6.7 kPa. liver enzymes was already improving at initial appt. he is following a strict diet.\par \par 6/22/23 Last seen by Dr. Craft on 3/29/22. States that he recently had BW few weeks ago and his LFTs much higher than in the past, no report for review. States that he has gained about 35-40 lbs since last visit due to back pain and recently went on intermittent fasting/ eating less and lost 9 lbs in 1.5 week. He reports being able to loss weight in the past but gained right back after stopping his diet. States that he eats healthy but large portions. Not much exercise but very active since he works for [employer] and [employer]. \par \par -7/20/23 No new complaint, feels fine. Lost some weight since last visit but gained back b/c he was on vacation. Recently found out that his cousins and one aunt with fatty liver and some cousins are normal weight. MRE completed.

## 2023-09-29 ENCOUNTER — APPOINTMENT (OUTPATIENT)
Dept: HEPATOLOGY | Facility: CLINIC | Age: 28
End: 2023-09-29

## 2023-10-02 ENCOUNTER — APPOINTMENT (OUTPATIENT)
Dept: HEPATOLOGY | Facility: CLINIC | Age: 28
End: 2023-10-02

## 2023-11-02 ENCOUNTER — APPOINTMENT (OUTPATIENT)
Dept: HEPATOLOGY | Facility: CLINIC | Age: 28
End: 2023-11-02

## 2024-10-19 ENCOUNTER — INPATIENT (INPATIENT)
Facility: HOSPITAL | Age: 29
LOS: 7 days | Discharge: HOME | DRG: 552 | End: 2024-10-27
Attending: NEUROLOGICAL SURGERY | Admitting: NEUROLOGICAL SURGERY
Payer: COMMERCIAL

## 2024-10-19 VITALS
WEIGHT: 315 LBS | DIASTOLIC BLOOD PRESSURE: 93 MMHG | RESPIRATION RATE: 16 BRPM | TEMPERATURE: 98 F | OXYGEN SATURATION: 95 % | SYSTOLIC BLOOD PRESSURE: 138 MMHG | HEART RATE: 85 BPM | HEIGHT: 73 IN

## 2024-10-19 DIAGNOSIS — M51.26 OTHER INTERVERTEBRAL DISC DISPLACEMENT, LUMBAR REGION: ICD-10-CM

## 2024-10-19 DIAGNOSIS — Z98.890 OTHER SPECIFIED POSTPROCEDURAL STATES: Chronic | ICD-10-CM

## 2024-10-19 DIAGNOSIS — S46.219A STRAIN OF MUSCLE, FASCIA AND TENDON OF OTHER PARTS OF BICEPS, UNSPECIFIED ARM, INITIAL ENCOUNTER: Chronic | ICD-10-CM

## 2024-10-19 LAB
ALBUMIN SERPL ELPH-MCNC: 4.1 G/DL — SIGNIFICANT CHANGE UP (ref 3.3–5.2)
ALP SERPL-CCNC: 52 U/L — SIGNIFICANT CHANGE UP (ref 40–120)
ALT FLD-CCNC: 65 U/L — HIGH
ANION GAP SERPL CALC-SCNC: 11 MMOL/L — SIGNIFICANT CHANGE UP (ref 5–17)
APTT BLD: 34.1 SEC — SIGNIFICANT CHANGE UP (ref 24.5–35.6)
AST SERPL-CCNC: 41 U/L — HIGH
BASOPHILS # BLD AUTO: 0.04 K/UL — SIGNIFICANT CHANGE UP (ref 0–0.2)
BASOPHILS NFR BLD AUTO: 0.6 % — SIGNIFICANT CHANGE UP (ref 0–2)
BILIRUB SERPL-MCNC: 0.8 MG/DL — SIGNIFICANT CHANGE UP (ref 0.4–2)
BLD GP AB SCN SERPL QL: SIGNIFICANT CHANGE UP
BUN SERPL-MCNC: 14.7 MG/DL — SIGNIFICANT CHANGE UP (ref 8–20)
CALCIUM SERPL-MCNC: 8.9 MG/DL — SIGNIFICANT CHANGE UP (ref 8.4–10.5)
CHLORIDE SERPL-SCNC: 103 MMOL/L — SIGNIFICANT CHANGE UP (ref 96–108)
CO2 SERPL-SCNC: 25 MMOL/L — SIGNIFICANT CHANGE UP (ref 22–29)
CREAT SERPL-MCNC: 0.75 MG/DL — SIGNIFICANT CHANGE UP (ref 0.5–1.3)
EGFR: 125 ML/MIN/1.73M2 — SIGNIFICANT CHANGE UP
EOSINOPHIL # BLD AUTO: 0.15 K/UL — SIGNIFICANT CHANGE UP (ref 0–0.5)
EOSINOPHIL NFR BLD AUTO: 2.4 % — SIGNIFICANT CHANGE UP (ref 0–6)
GLUCOSE BLDC GLUCOMTR-MCNC: 95 MG/DL — SIGNIFICANT CHANGE UP (ref 70–99)
GLUCOSE SERPL-MCNC: 79 MG/DL — SIGNIFICANT CHANGE UP (ref 70–99)
HCT VFR BLD CALC: 45.2 % — SIGNIFICANT CHANGE UP (ref 39–50)
HGB BLD-MCNC: 15.6 G/DL — SIGNIFICANT CHANGE UP (ref 13–17)
IMM GRANULOCYTES NFR BLD AUTO: 0.3 % — SIGNIFICANT CHANGE UP (ref 0–0.9)
INR BLD: 1.03 RATIO — SIGNIFICANT CHANGE UP (ref 0.85–1.16)
LYMPHOCYTES # BLD AUTO: 1.84 K/UL — SIGNIFICANT CHANGE UP (ref 1–3.3)
LYMPHOCYTES # BLD AUTO: 29 % — SIGNIFICANT CHANGE UP (ref 13–44)
MCHC RBC-ENTMCNC: 29.2 PG — SIGNIFICANT CHANGE UP (ref 27–34)
MCHC RBC-ENTMCNC: 34.5 GM/DL — SIGNIFICANT CHANGE UP (ref 32–36)
MCV RBC AUTO: 84.6 FL — SIGNIFICANT CHANGE UP (ref 80–100)
MONOCYTES # BLD AUTO: 0.66 K/UL — SIGNIFICANT CHANGE UP (ref 0–0.9)
MONOCYTES NFR BLD AUTO: 10.4 % — SIGNIFICANT CHANGE UP (ref 2–14)
NEUTROPHILS # BLD AUTO: 3.64 K/UL — SIGNIFICANT CHANGE UP (ref 1.8–7.4)
NEUTROPHILS NFR BLD AUTO: 57.3 % — SIGNIFICANT CHANGE UP (ref 43–77)
PLATELET # BLD AUTO: 232 K/UL — SIGNIFICANT CHANGE UP (ref 150–400)
POTASSIUM SERPL-MCNC: 4.6 MMOL/L — SIGNIFICANT CHANGE UP (ref 3.5–5.3)
POTASSIUM SERPL-SCNC: 4.6 MMOL/L — SIGNIFICANT CHANGE UP (ref 3.5–5.3)
PROT SERPL-MCNC: 6.9 G/DL — SIGNIFICANT CHANGE UP (ref 6.6–8.7)
PROTHROM AB SERPL-ACNC: 12 SEC — SIGNIFICANT CHANGE UP (ref 9.9–13.4)
RBC # BLD: 5.34 M/UL — SIGNIFICANT CHANGE UP (ref 4.2–5.8)
RBC # FLD: 13.2 % — SIGNIFICANT CHANGE UP (ref 10.3–14.5)
SODIUM SERPL-SCNC: 139 MMOL/L — SIGNIFICANT CHANGE UP (ref 135–145)
WBC # BLD: 6.35 K/UL — SIGNIFICANT CHANGE UP (ref 3.8–10.5)
WBC # FLD AUTO: 6.35 K/UL — SIGNIFICANT CHANGE UP (ref 3.8–10.5)

## 2024-10-19 PROCEDURE — 93010 ELECTROCARDIOGRAM REPORT: CPT

## 2024-10-19 PROCEDURE — 72148 MRI LUMBAR SPINE W/O DYE: CPT | Mod: 26,MC

## 2024-10-19 PROCEDURE — 72131 CT LUMBAR SPINE W/O DYE: CPT | Mod: 26,MC

## 2024-10-19 PROCEDURE — 99285 EMERGENCY DEPT VISIT HI MDM: CPT

## 2024-10-19 RX ORDER — KETOROLAC TROMETHAMINE 30 MG/ML
30 INJECTION INTRAMUSCULAR; INTRAVENOUS ONCE
Refills: 0 | Status: DISCONTINUED | OUTPATIENT
Start: 2024-10-19 | End: 2024-10-19

## 2024-10-19 RX ORDER — B-COMPLEX WITH VITAMIN C
1 VIAL (ML) INJECTION
Refills: 0 | DISCHARGE

## 2024-10-19 RX ORDER — PANTOPRAZOLE SODIUM 40 MG/1
40 TABLET, DELAYED RELEASE ORAL DAILY
Refills: 0 | Status: DISCONTINUED | OUTPATIENT
Start: 2024-10-19 | End: 2024-10-22

## 2024-10-19 RX ORDER — ENOXAPARIN SODIUM 40MG/0.4ML
40 SYRINGE (ML) SUBCUTANEOUS EVERY 12 HOURS
Refills: 0 | Status: DISCONTINUED | OUTPATIENT
Start: 2024-10-19 | End: 2024-10-21

## 2024-10-19 RX ORDER — ONDANSETRON HYDROCHLORIDE 2 MG/ML
4 INJECTION, SOLUTION INTRAMUSCULAR; INTRAVENOUS EVERY 6 HOURS
Refills: 0 | Status: DISCONTINUED | OUTPATIENT
Start: 2024-10-19 | End: 2024-10-22

## 2024-10-19 RX ORDER — INSULIN LISPRO 100/ML
VIAL (ML) SUBCUTANEOUS
Refills: 0 | Status: DISCONTINUED | OUTPATIENT
Start: 2024-10-19 | End: 2024-10-22

## 2024-10-19 RX ORDER — DEXAMETHASONE 1.5 MG 1.5 MG/1
4 TABLET ORAL EVERY 6 HOURS
Refills: 0 | Status: DISCONTINUED | OUTPATIENT
Start: 2024-10-19 | End: 2024-10-22

## 2024-10-19 RX ORDER — POLYETHYLENE GLYCOL 3350 17 G/17G
17 POWDER, FOR SOLUTION ORAL DAILY
Refills: 0 | Status: DISCONTINUED | OUTPATIENT
Start: 2024-10-19 | End: 2024-10-22

## 2024-10-19 RX ORDER — ENOXAPARIN SODIUM 40MG/0.4ML
30 SYRINGE (ML) SUBCUTANEOUS EVERY 12 HOURS
Refills: 0 | Status: DISCONTINUED | OUTPATIENT
Start: 2024-10-19 | End: 2024-10-19

## 2024-10-19 RX ORDER — INSULIN LISPRO 100/ML
VIAL (ML) SUBCUTANEOUS AT BEDTIME
Refills: 0 | Status: DISCONTINUED | OUTPATIENT
Start: 2024-10-19 | End: 2024-10-22

## 2024-10-19 RX ORDER — B-COMPLEX WITH VITAMIN C
1 VIAL (ML) INJECTION DAILY
Refills: 0 | Status: DISCONTINUED | OUTPATIENT
Start: 2024-10-19 | End: 2024-10-22

## 2024-10-19 RX ORDER — LIDOCAINE HYDROCHLORIDE 40 MG/ML
1 SOLUTION TOPICAL DAILY
Refills: 0 | Status: DISCONTINUED | OUTPATIENT
Start: 2024-10-19 | End: 2024-10-22

## 2024-10-19 RX ORDER — METHOCARBAMOL 500 MG/1
1500 TABLET ORAL ONCE
Refills: 0 | Status: COMPLETED | OUTPATIENT
Start: 2024-10-19 | End: 2024-10-19

## 2024-10-19 RX ORDER — ACETAMINOPHEN 500 MG
650 TABLET ORAL EVERY 6 HOURS
Refills: 0 | Status: DISCONTINUED | OUTPATIENT
Start: 2024-10-19 | End: 2024-10-22

## 2024-10-19 RX ORDER — LIDOCAINE HYDROCHLORIDE 40 MG/ML
1 SOLUTION TOPICAL ONCE
Refills: 0 | Status: COMPLETED | OUTPATIENT
Start: 2024-10-19 | End: 2024-10-19

## 2024-10-19 RX ORDER — SENNA 187 MG
2 TABLET ORAL AT BEDTIME
Refills: 0 | Status: DISCONTINUED | OUTPATIENT
Start: 2024-10-19 | End: 2024-10-22

## 2024-10-19 RX ORDER — INFLUENZ VIR VAC TV P-SURF2003 15MCG/.5ML
0.5 SYRINGE (ML) INTRAMUSCULAR ONCE
Refills: 0 | Status: DISCONTINUED | OUTPATIENT
Start: 2024-10-19 | End: 2024-10-27

## 2024-10-19 RX ORDER — DIAZEPAM 10 MG/1
2.5 FILM BUCCAL ONCE
Refills: 0 | Status: DISCONTINUED | OUTPATIENT
Start: 2024-10-19 | End: 2024-10-19

## 2024-10-19 RX ORDER — METHOCARBAMOL 500 MG/1
500 TABLET ORAL EVERY 8 HOURS
Refills: 0 | Status: DISCONTINUED | OUTPATIENT
Start: 2024-10-19 | End: 2024-10-19

## 2024-10-19 RX ORDER — GLUCAGON INJECTION, SOLUTION 1 MG/.2ML
1 INJECTION, SOLUTION SUBCUTANEOUS ONCE
Refills: 0 | Status: DISCONTINUED | OUTPATIENT
Start: 2024-10-19 | End: 2024-10-22

## 2024-10-19 RX ORDER — ACETAMINOPHEN 500 MG
1000 TABLET ORAL EVERY 6 HOURS
Refills: 0 | Status: DISCONTINUED | OUTPATIENT
Start: 2024-10-19 | End: 2024-10-22

## 2024-10-19 RX ORDER — METHOCARBAMOL 500 MG/1
750 TABLET ORAL THREE TIMES A DAY
Refills: 0 | Status: DISCONTINUED | OUTPATIENT
Start: 2024-10-19 | End: 2024-10-22

## 2024-10-19 RX ADMIN — DIAZEPAM 2.5 MILLIGRAM(S): 10 FILM BUCCAL at 15:00

## 2024-10-19 RX ADMIN — DEXAMETHASONE 1.5 MG 4 MILLIGRAM(S): 1.5 TABLET ORAL at 23:35

## 2024-10-19 RX ADMIN — LIDOCAINE HYDROCHLORIDE 1 PATCH: 40 SOLUTION TOPICAL at 17:24

## 2024-10-19 RX ADMIN — METHOCARBAMOL 750 MILLIGRAM(S): 500 TABLET ORAL at 21:38

## 2024-10-19 RX ADMIN — METHOCARBAMOL 1500 MILLIGRAM(S): 500 TABLET ORAL at 17:24

## 2024-10-19 RX ADMIN — Medication 40 MILLIGRAM(S): at 21:37

## 2024-10-19 RX ADMIN — KETOROLAC TROMETHAMINE 30 MILLIGRAM(S): 30 INJECTION INTRAMUSCULAR; INTRAVENOUS at 15:00

## 2024-10-19 RX ADMIN — LIDOCAINE HYDROCHLORIDE 1 PATCH: 40 SOLUTION TOPICAL at 21:00

## 2024-10-19 NOTE — CONSULT NOTE ADULT - SUBJECTIVE AND OBJECTIVE BOX
HISTORY OF PRESENT ILLNESS:   29yM PMH     PAST MEDICAL & SURGICAL HISTORY:  H/o L5-S1 lami    SOCIAL HISTORY:  Tobacco Use:  EtOH use:   Substance:    ALLERGIES:  Omnicef (Rash)    REVIEW OF SYSTEMS  Negative except as noted in HPI    HOME MEDICATIONS:      Vital Signs Last 24 Hrs  T(C): 36.7 (19 Oct 2024 13:52), Max: 36.7 (19 Oct 2024 13:52)  T(F): 98.1 (19 Oct 2024 13:52), Max: 98.1 (19 Oct 2024 13:52)  HR: 85 (19 Oct 2024 13:52) (85 - 85)  BP: 138/93 (19 Oct 2024 13:52) (138/93 - 138/93)  RR: 16 (19 Oct 2024 13:52) (16 - 16)  SpO2: 95% (19 Oct 2024 13:52) (95% - 95%)  Parameters below as of 19 Oct 2024 13:52  Patient On (Oxygen Delivery Method): room air    PHYSICAL EXAM:  GENERAL: NAD, well-groomed  HEAD: Atraumatic, normocephalic  DRAINS:   MENTAL STATUS: AAO x3; awake; opens eyes spontaneously; appropriately conversant without aphasia; following simple commands  CRANIAL NERVES: PERRL. EOMI. Face grossly symmetric. Hearing grossly intact. Speech clear. Head turning and shoulder shrug intact.   MOTOR:   SENSATION:   CHEST/LUNG: Non-labored breathing on room air  SKIN: Warm, dry    LABS:                        15.6   6.35  )-----------( 232      ( 19 Oct 2024 14:55 )             45.2     10-19    139  |  103  |  14.7  ----------------------------<  79  4.6   |  25.0  |  0.75    Ca    8.9      19 Oct 2024 14:55    TPro  6.9  /  Alb  4.1  /  TBili  0.8  /  DBili  x   /  AST  41[H]  /  ALT  65[H]  /  AlkPhos  52  10-19    PT/INR - ( 19 Oct 2024 14:55 )   PT: 12.0 sec;   INR: 1.03 ratio    PTT - ( 19 Oct 2024 14:55 )  PTT:34.1 sec    Urinalysis Basic - ( 19 Oct 2024 14:55 )  Color: x / Appearance: x / SG: x / pH: x  Gluc: 79 mg/dL / Ketone: x  / Bili: x / Urobili: x   Blood: x / Protein: x / Nitrite: x   Leuk Esterase: x / RBC: x / WBC x   Sq Epi: x / Non Sq Epi: x / Bacteria: x    RADIOLOGY & ADDITIONAL STUDIES:     HISTORY OF PRESENT ILLNESS:   29-year-old male with PSH of right L5-S1 hemilaminectomy for disc herniation with Dr. Sykes (10/2020) and repair of left bicep tendon (10/2023) presents to ED today after experiencing acute on chronic lower back pain with radiation to b/l gluteal muscles and b/l anterior thigh numbness/tingling x 3 days. Patient states symptoms worsen with ambulation and occur intermittently throughout the day, at the worst pain is 7/10 in the back. Patient states he has chronic numbness in right anteriolateral thigh and lateral aspect of right foot. Denies any recent trauma. States that pain in back is described as a tightening pain and was relieved after taking a muscle relaxer yesterday. Denies any epidural steroid injections. Denies any AC/AP use.     PAST MEDICAL & SURGICAL HISTORY:  H/o right L5-S1 laminectomy (10/2020)  H/o left bicep tendon repair (10/2023)    ALLERGIES:  Omnicef (Rash)    REVIEW OF SYSTEMS  Negative except as noted in HPI    HOME MEDICATIONS:  Multivitamin  Ibuprofen PRN    Vital Signs Last 24 Hrs  T(C): 36.7 (19 Oct 2024 13:52), Max: 36.7 (19 Oct 2024 13:52)  T(F): 98.1 (19 Oct 2024 13:52), Max: 98.1 (19 Oct 2024 13:52)  HR: 85 (19 Oct 2024 13:52) (85 - 85)  BP: 138/93 (19 Oct 2024 13:52) (138/93 - 138/93)  RR: 16 (19 Oct 2024 13:52) (16 - 16)  SpO2: 95% (19 Oct 2024 13:52) (95% - 95%)  Parameters below as of 19 Oct 2024 13:52  Patient On (Oxygen Delivery Method): room air    PHYSICAL EXAM:  GENERAL: NAD, well-groomed  HEAD: Atraumatic, normocephalic  MENTAL STATUS: AAO x3; awake; opens eyes spontaneously; appropriately conversant without aphasia; following simple commands  CRANIAL NERVES: PERRL. EOMI. Face grossly symmetric. Hearing grossly intact. Speech clear. Head turning and shoulder shrug intact.   MOTOR: Strength 5/5 throughout  SENSATION: Decreased sensation of right anteriolateral thigh and lateral right foot (baseline) otherwise intact  CHEST/LUNG: Non-labored breathing on room air  SKIN: Warm, dry    LABS:                        15.6   6.35  )-----------( 232      ( 19 Oct 2024 14:55 )             45.2     10-19    139  |  103  |  14.7  ----------------------------<  79  4.6   |  25.0  |  0.75    Ca    8.9      19 Oct 2024 14:55    TPro  6.9  /  Alb  4.1  /  TBili  0.8  /  DBili  x   /  AST  41[H]  /  ALT  65[H]  /  AlkPhos  52  10-19    PT/INR - ( 19 Oct 2024 14:55 )   PT: 12.0 sec;   INR: 1.03 ratio    PTT - ( 19 Oct 2024 14:55 )  PTT:34.1 sec    Urinalysis Basic - ( 19 Oct 2024 14:55 )  Color: x / Appearance: x / SG: x / pH: x  Gluc: 79 mg/dL / Ketone: x  / Bili: x / Urobili: x   Blood: x / Protein: x / Nitrite: x   Leuk Esterase: x / RBC: x / WBC x   Sq Epi: x / Non Sq Epi: x / Bacteria: x    RADIOLOGY & ADDITIONAL STUDIES:    MR Lumbar Spine No Cont (10.19.24 @ 15:35)  IMPRESSION:  L5-S1: Right hemilaminotomy. Disc osteophyte complex with large   central/right paracentral/foraminal disc extrusion, resulting in severe   spinal canal stenosis and severe right lateral recess stenosis with   impingement upon the right descending S1 nerve root. The disc osteophyte   complex also narrows the left lateral recess and contacts the left   descending S1 nerve root. Bilateral facet arthrosis. Moderate right and   mild left neural foraminal stenosis.

## 2024-10-19 NOTE — ED PROVIDER NOTE - PHYSICAL EXAMINATION
Gen: Well appearing in NAD  Head: NC/AT  Neck: trachea midline  Card: regular rate and rhythm  Resp:  CTAB  Abd: soft, non-distended, non-tender  Ext: no deformities  Neuro: A&Ox4, decreased sensation L5/S1 distribution to light touch, 4/5 strength RLE, 5/5 strength LLE, intact sensation LLE  Skin:  Warm and dry as visualized  Psych:  Normal affect and mood
right forearm pain/bruising, had hand surgery, seen a couple of days ago for swelling and splint was removed/rewrapped with improvement, bruising/ttp is not at the site of surgery and no other trauma, will us r/o dvt, may be contused/deep hematoma from the swelling in the splint a few days ago

## 2024-10-19 NOTE — H&P ADULT - NSHPLABSRESULTS_GEN_ALL_CORE
LABS:                        15.6   6.35  )-----------( 232      ( 19 Oct 2024 14:55 )             45.2     10-19    139  |  103  |  14.7  ----------------------------<  79  4.6   |  25.0  |  0.75    Ca    8.9      19 Oct 2024 14:55    TPro  6.9  /  Alb  4.1  /  TBili  0.8  /  DBili  x   /  AST  41[H]  /  ALT  65[H]  /  AlkPhos  52  10-19    PT/INR - ( 19 Oct 2024 14:55 )   PT: 12.0 sec;   INR: 1.03 ratio         PTT - ( 19 Oct 2024 14:55 )  PTT:34.1 sec  Urinalysis Basic - ( 19 Oct 2024 14:55 )    Color: x / Appearance: x / SG: x / pH: x  Gluc: 79 mg/dL / Ketone: x  / Bili: x / Urobili: x   Blood: x / Protein: x / Nitrite: x   Leuk Esterase: x / RBC: x / WBC x   Sq Epi: x / Non Sq Epi: x / Bacteria: x    RADIOLOGY    MR Lumbar Spine No Cont (10.19.24 @ 15:35)   IMPRESSION:  L5-S1: Right hemilaminotomy. Disc osteophyte complex with large   central/right paracentral/foraminal disc extrusion, resulting in severe   spinal canal stenosis and severe right lateral recess stenosis with   impingement upon the right descending S1 nerve root. The disc osteophyte   complex also narrows the left lateral recess and contacts the left   descending S1 nerve root. Bilateral facet arthrosis. Moderate right and   mild left neural foraminal stenosis.

## 2024-10-19 NOTE — H&P ADULT - ASSESSMENT
29M with PSH of right L5-S1 hemilaminectomy for disc herniation with Dr. Sykes (10/2020) presents to ED today after experiencing acute on chronic lower back pain with radiation to b/l gluteal muscles and b/l anterior thigh numbness/tingling x 3 days, R>L found to have disc extrusion at L5-S1 on MRI L spine.    Plan:  - MRI L-spine reviewed with Dr. Woodson- L5-S1 disc herniation with severe spinal canal stenosis  - CT L-spine pending for potential OR planning   - Admitting to neurosurgery service for potential OR planning  - Q4h neuro checks  - Decadron 4mg q6h   - Robaxin 750 q8h PRN muscle spasm  - Pain control Tylenol, Oxy PRN, Lidocaine patches PRN  - Pre-op labs pending  - Normotensive SBP goal  - ISS and PPI while on steroids  - Bowel regimen: senna, miralax  - SCDs for DVT ppx  - Discussed with Dr. Woodson

## 2024-10-19 NOTE — ED PROVIDER NOTE - CLINICAL SUMMARY MEDICAL DECISION MAKING FREE TEXT BOX
pt w/ PMHx of laminectomy L5/S1 4 years ago presenting w/ back pain and neuro sx. MRI called for urgent imaging. Preop labs and analgesia ordered.

## 2024-10-19 NOTE — H&P ADULT - NSHPPHYSICALEXAM_GEN_ALL_CORE
GENERAL: NAD, well-groomed  HEAD: Atraumatic, normocephalic  MENTAL STATUS: AAO x3; awake; opens eyes spontaneously; appropriately conversant without aphasia; following simple commands  CRANIAL NERVES: PERRL. EOMI. Face grossly symmetric. Hearing grossly intact. Speech clear. Head turning and shoulder shrug intact.   MOTOR: Strength 5/5 throughout  SENSATION: Decreased sensation of right anteriolateral thigh and lateral right foot (baseline) otherwise intact  CHEST/LUNG: Non-labored breathing on room air  SKIN: Warm, dry

## 2024-10-19 NOTE — ED ADULT NURSE NOTE - OBJECTIVE STATEMENT
Pt presents to the ED A&Ox4 c/o back pain that started a week ago. Pt reports back pain goes down into glutes, pt reports some numbness/tingling on thursday in the thigh but has since resolved. Pt reports PMH of laminectomy x 4 years ago and concerned he reinjured his back. Pt denies other PMH. Pt denies chest/abdominal pain and SOB,

## 2024-10-19 NOTE — CONSULT NOTE ADULT - ASSESSMENT
INCOMPLETE NOTE- PT IN MRI 29M with PSH of right L5-S1 hemilaminectomy for disc herniation with Dr. Sykes (10/2020) presents to ED today after experiencing acute on chronic lower back pain with radiation to b/l gluteal muscles and b/l anterior thigh numbness/tingling x 3 days, R>L found to have disc extrusion at L5-S1 on MRI L spine.    Plan:  - Recommend CT L spine  - Pain control with Robaxin PRN, Lidocaine patches PRN  - MRI L-spine completed pending review by neurosurgeon  - Further recommendations pending discussion with Dr. Woodson    Incomplete note 29M with PSH of right L5-S1 hemilaminectomy for disc herniation with Dr. Sykes (10/2020) presents to ED today after experiencing acute on chronic lower back pain with radiation to b/l gluteal muscles and b/l anterior thigh numbness/tingling x 3 days, R>L found to have disc extrusion at L5-S1 on MRI L spine.    Plan:  - Recommend CT L spine  - Pain control with Robaxin PRN, Lidocaine patches PRN  - MRI L-spine completed pending review by neurosurgeon  - Will admit to neurosurgery service to pre-op for tentative OR this week  - Discussed with Dr. Woodson

## 2024-10-19 NOTE — H&P ADULT - HISTORY OF PRESENT ILLNESS
29-year-old male with PSH of right L5-S1 hemilaminectomy for disc herniation with Dr. Sykes (10/2020) and repair of left bicep tendon (10/2023) presents to ED today after experiencing acute on chronic lower back pain with radiation to b/l gluteal muscles and b/l anterior thigh numbness/tingling x 3 days. Patient states symptoms worsen with ambulation and occur intermittently throughout the day, at the worst pain is 7/10 in the back. Patient states he has chronic numbness in right anteriolateral thigh and lateral aspect of right foot. Denies any recent trauma. States that pain in back is described as a tightening pain and was relieved after taking a muscle relaxer yesterday. Denies any epidural steroid injections. Denies any AC/AP use.

## 2024-10-19 NOTE — ED PROVIDER NOTE - OBJECTIVE STATEMENT
30 y/o M pt w/ PMHx of laminectomy L5/S1 at [facility] presenting w/ lower back pain and numbness, tingling and weakness RLE x3 days. Pt has chronic numbness to R heel but new numbness to lateral aspect of his R thigh and feels like his R leg is weaker than left.

## 2024-10-19 NOTE — PATIENT PROFILE ADULT - FALL HARM RISK - UNIVERSAL INTERVENTIONS
Bed in lowest position, wheels locked, appropriate side rails in place/Call bell, personal items and telephone in reach/Instruct patient to call for assistance before getting out of bed or chair/Non-slip footwear when patient is out of bed/[place] to call system/Physically safe environment - no spills, clutter or unnecessary equipment/Purposeful Proactive Rounding/Room/bathroom lighting operational, light cord in reach

## 2024-10-19 NOTE — PATIENT PROFILE ADULT - HOME ACCESSIBILITY CONCERNS
Is the patient currently in the state of [place]? YES    Visit mode:VIDEO    If the visit is dropped, the patient can be reconnected by: VIDEO VISIT: Text to cell phone: [phone]    Will anyone else be joining the visit? NO      How would you like to obtain your AVS? MyChart    Are changes needed to the allergy or medication list? NO    Reason for visit: Follow up          stairs to enter home/stairs within home

## 2024-10-20 LAB
A1C WITH ESTIMATED AVERAGE GLUCOSE RESULT: 5 % — SIGNIFICANT CHANGE UP (ref 4–5.6)
ALBUMIN SERPL ELPH-MCNC: 4 G/DL — SIGNIFICANT CHANGE UP (ref 3.3–5.2)
ALP SERPL-CCNC: 53 U/L — SIGNIFICANT CHANGE UP (ref 40–120)
ALT FLD-CCNC: 65 U/L — HIGH
ANION GAP SERPL CALC-SCNC: 11 MMOL/L — SIGNIFICANT CHANGE UP (ref 5–17)
APTT BLD: 38.4 SEC — HIGH (ref 24.5–35.6)
AST SERPL-CCNC: 38 U/L — SIGNIFICANT CHANGE UP
BILIRUB SERPL-MCNC: 0.6 MG/DL — SIGNIFICANT CHANGE UP (ref 0.4–2)
BUN SERPL-MCNC: 17.7 MG/DL — SIGNIFICANT CHANGE UP (ref 8–20)
CALCIUM SERPL-MCNC: 8.7 MG/DL — SIGNIFICANT CHANGE UP (ref 8.4–10.5)
CHLORIDE SERPL-SCNC: 103 MMOL/L — SIGNIFICANT CHANGE UP (ref 96–108)
CO2 SERPL-SCNC: 23 MMOL/L — SIGNIFICANT CHANGE UP (ref 22–29)
CREAT SERPL-MCNC: 0.77 MG/DL — SIGNIFICANT CHANGE UP (ref 0.5–1.3)
EGFR: 124 ML/MIN/1.73M2 — SIGNIFICANT CHANGE UP
ESTIMATED AVERAGE GLUCOSE: 97 MG/DL — SIGNIFICANT CHANGE UP (ref 68–114)
GLUCOSE BLDC GLUCOMTR-MCNC: 137 MG/DL — HIGH (ref 70–99)
GLUCOSE BLDC GLUCOMTR-MCNC: 146 MG/DL — HIGH (ref 70–99)
GLUCOSE BLDC GLUCOMTR-MCNC: 156 MG/DL — HIGH (ref 70–99)
GLUCOSE BLDC GLUCOMTR-MCNC: 158 MG/DL — HIGH (ref 70–99)
GLUCOSE SERPL-MCNC: 106 MG/DL — HIGH (ref 70–99)
HCT VFR BLD CALC: 45.2 % — SIGNIFICANT CHANGE UP (ref 39–50)
HGB BLD-MCNC: 15.3 G/DL — SIGNIFICANT CHANGE UP (ref 13–17)
INR BLD: 1.04 RATIO — SIGNIFICANT CHANGE UP (ref 0.85–1.16)
MAGNESIUM SERPL-MCNC: 1.9 MG/DL — SIGNIFICANT CHANGE UP (ref 1.6–2.6)
MCHC RBC-ENTMCNC: 28.8 PG — SIGNIFICANT CHANGE UP (ref 27–34)
MCHC RBC-ENTMCNC: 33.8 GM/DL — SIGNIFICANT CHANGE UP (ref 32–36)
MCV RBC AUTO: 85 FL — SIGNIFICANT CHANGE UP (ref 80–100)
PHOSPHATE SERPL-MCNC: 2.8 MG/DL — SIGNIFICANT CHANGE UP (ref 2.4–4.7)
PLATELET # BLD AUTO: 223 K/UL — SIGNIFICANT CHANGE UP (ref 150–400)
POTASSIUM SERPL-MCNC: 4.2 MMOL/L — SIGNIFICANT CHANGE UP (ref 3.5–5.3)
POTASSIUM SERPL-SCNC: 4.2 MMOL/L — SIGNIFICANT CHANGE UP (ref 3.5–5.3)
PROT SERPL-MCNC: 6.5 G/DL — LOW (ref 6.6–8.7)
PROTHROM AB SERPL-ACNC: 11.7 SEC — SIGNIFICANT CHANGE UP (ref 9.9–13.4)
RBC # BLD: 5.32 M/UL — SIGNIFICANT CHANGE UP (ref 4.2–5.8)
RBC # FLD: 13.2 % — SIGNIFICANT CHANGE UP (ref 10.3–14.5)
SODIUM SERPL-SCNC: 137 MMOL/L — SIGNIFICANT CHANGE UP (ref 135–145)
WBC # BLD: 6.12 K/UL — SIGNIFICANT CHANGE UP (ref 3.8–10.5)
WBC # FLD AUTO: 6.12 K/UL — SIGNIFICANT CHANGE UP (ref 3.8–10.5)

## 2024-10-20 PROCEDURE — 99222 1ST HOSP IP/OBS MODERATE 55: CPT

## 2024-10-20 PROCEDURE — 99231 SBSQ HOSP IP/OBS SF/LOW 25: CPT

## 2024-10-20 RX ORDER — MAGNESIUM SULFATE IN 0.9% NACL 2 G/50 ML
1 INTRAVENOUS SOLUTION, PIGGYBACK (ML) INTRAVENOUS ONCE
Refills: 0 | Status: COMPLETED | OUTPATIENT
Start: 2024-10-20 | End: 2024-10-20

## 2024-10-20 RX ADMIN — Medication 2 TABLET(S): at 22:01

## 2024-10-20 RX ADMIN — DEXAMETHASONE 1.5 MG 4 MILLIGRAM(S): 1.5 TABLET ORAL at 18:08

## 2024-10-20 RX ADMIN — METHOCARBAMOL 750 MILLIGRAM(S): 500 TABLET ORAL at 05:10

## 2024-10-20 RX ADMIN — POLYETHYLENE GLYCOL 3350 17 GRAM(S): 17 POWDER, FOR SOLUTION ORAL at 11:54

## 2024-10-20 RX ADMIN — Medication 400 MILLIGRAM(S): at 18:09

## 2024-10-20 RX ADMIN — Medication 1000 MILLIGRAM(S): at 19:19

## 2024-10-20 RX ADMIN — Medication 1 TABLET(S): at 11:53

## 2024-10-20 RX ADMIN — DEXAMETHASONE 1.5 MG 4 MILLIGRAM(S): 1.5 TABLET ORAL at 23:38

## 2024-10-20 RX ADMIN — PANTOPRAZOLE SODIUM 40 MILLIGRAM(S): 40 TABLET, DELAYED RELEASE ORAL at 11:53

## 2024-10-20 RX ADMIN — DEXAMETHASONE 1.5 MG 4 MILLIGRAM(S): 1.5 TABLET ORAL at 05:10

## 2024-10-20 RX ADMIN — LIDOCAINE HYDROCHLORIDE 1 PATCH: 40 SOLUTION TOPICAL at 05:58

## 2024-10-20 RX ADMIN — Medication 40 MILLIGRAM(S): at 22:00

## 2024-10-20 RX ADMIN — Medication 400 MILLIGRAM(S): at 11:54

## 2024-10-20 RX ADMIN — Medication 1000 MILLIGRAM(S): at 12:45

## 2024-10-20 RX ADMIN — METHOCARBAMOL 750 MILLIGRAM(S): 500 TABLET ORAL at 13:22

## 2024-10-20 RX ADMIN — Medication 100 GRAM(S): at 06:25

## 2024-10-20 RX ADMIN — METHOCARBAMOL 750 MILLIGRAM(S): 500 TABLET ORAL at 22:01

## 2024-10-20 RX ADMIN — Medication 1: at 12:06

## 2024-10-20 RX ADMIN — Medication 40 MILLIGRAM(S): at 11:54

## 2024-10-20 RX ADMIN — DEXAMETHASONE 1.5 MG 4 MILLIGRAM(S): 1.5 TABLET ORAL at 11:53

## 2024-10-20 NOTE — CONSULT NOTE ADULT - ASSESSMENT
28 y/o male with Hx of right L5-S1 hemilaminectomy for disc herniation with Dr. Sykes (10/2020) and repair of left bicep tendon (10/2023), he came to ED after experiencing acute on chronic lower back pain with radiation to b/l gluteal muscles and b/l anterior thigh numbness/tingling x 3 days. Patient states symptoms worsen with ambulation and occur intermittently throughout the day, denies any recent trauma, admitted under Neurosurgery team, medicine consulted for Medical management.      Plan:     Lower back pain:     - VS stable  - Pre op abx per primary team   - IVF when NPO  - opiate induced constipation regimen   - encouraging incentive spirometry   -DVT prophylaxis and Pain meds as per primary team   -PT/OT and weight bearing per primary team     Patient denies Hx of exertional dysnea or chest pain, no personal or family hx of easy bleeding, Patient's METS Score is >4 and RCRI is 0 patient labs, EKG reviewed, patient is at low risk for perioperative cardiovascular complication and is optimized from the medicine point of view for planned procedure.

## 2024-10-20 NOTE — CONSULT NOTE ADULT - SUBJECTIVE AND OBJECTIVE BOX
30 y/o male with Hx of right L5-S1 hemilaminectomy for disc herniation with Dr. Sykes (10/2020) and repair of left bicep tendon (10/2023), he came to ED after experiencing acute on chronic lower back pain with radiation to b/l gluteal muscles and b/l anterior thigh numbness/tingling x 3 days. Patient states symptoms worsen with ambulation and occur intermittently throughout the day, denies any recent trauma, admitted under Neurosurgery team, medicine consulted for Medical management.        Allergies:  	Omnicef: Drug, Rash    Home Medications:   * Outpatient Medication Status not yet specified      PAST MEDICAL HISTORY:  Lumbar herniated disc.     PAST SURGICAL HISTORY:  Biceps tendon rupture     H/O laminectomy.    Social History:  Not a smoker, drinker or using any drugs         Vital Signs Last 24 Hrs  T(C): 36.6 (20 Oct 2024 08:50), Max: 36.7 (19 Oct 2024 13:52)  T(F): 97.9 (20 Oct 2024 08:50), Max: 98.1 (19 Oct 2024 13:52)  HR: 72 (20 Oct 2024 08:50) (62 - 85)  BP: 156/83 (20 Oct 2024 08:50) (126/77 - 158/84)  RR: 18 (20 Oct 2024 08:50) (16 - 18)  SpO2: 94% (20 Oct 2024 08:50) (94% - 98%)    Parameters below as of 20 Oct 2024 08:50  Patient On (Oxygen Delivery Method): room air        PHYSICAL EXAM:    GENERAL: Young male looking comfortable   HEENT: PERRL, +EOMI  NECK: soft, Supple, No JVD  CHEST/LUNG: Clear to auscultate bilaterally; No wheezing  HEART: S1S2+, Regular rate and rhythm; No murmurs  ABDOMEN: Soft, Nontender, Nondistended; Bowel sounds present  EXTREMITIES:  1+ Peripheral Pulses, No edema  SKIN: No rashes or lesions  NEURO: AAOX3  PSYCH: normal mood        LABS:                        15.3   6.12  )-----------( 223      ( 20 Oct 2024 03:45 )             45.2     10-20    137  |  103  |  17.7  ----------------------------<  106[H]  4.2   |  23.0  |  0.77    Ca    8.7      20 Oct 2024 03:45  Phos  2.8     10-20  Mg     1.9     10-20    TPro  6.5[L]  /  Alb  4.0  /  TBili  0.6  /  DBili  x   /  AST  38  /  ALT  65[H]  /  AlkPhos  53  10-20    PT/INR - ( 20 Oct 2024 03:45 )   PT: 11.7 sec;   INR: 1.04 ratio         PTT - ( 20 Oct 2024 03:45 )  PTT:38.4 sec        I&O's Summary    19 Oct 2024 07:01  -  20 Oct 2024 07:00  --------------------------------------------------------  IN: 90 mL / OUT: 0 mL / NET: 90 mL        MEDICATIONS  (STANDING):  dexAMETHasone     Tablet 4 milliGRAM(s) Oral every 6 hours  dextrose 5%. 1000 milliLiter(s) (100 mL/Hr) IV Continuous <Continuous>  dextrose 5%. 1000 milliLiter(s) (50 mL/Hr) IV Continuous <Continuous>  dextrose 50% Injectable 12.5 Gram(s) IV Push once  dextrose 50% Injectable 25 Gram(s) IV Push once  dextrose 50% Injectable 25 Gram(s) IV Push once  enoxaparin Injectable 40 milliGRAM(s) SubCutaneous every 12 hours  glucagon  Injectable 1 milliGRAM(s) IntraMuscular once  influenza   Vaccine 0.5 milliLiter(s) IntraMuscular once  insulin lispro (ADMELOG) corrective regimen sliding scale   SubCutaneous at bedtime  insulin lispro (ADMELOG) corrective regimen sliding scale   SubCutaneous three times a day before meals  methocarbamol 750 milliGRAM(s) Oral three times a day  multivitamin 1 Tablet(s) Oral daily  pantoprazole  Injectable 40 milliGRAM(s) IV Push daily  polyethylene glycol 3350 17 Gram(s) Oral daily  senna 2 Tablet(s) Oral at bedtime    MEDICATIONS  (PRN):  acetaminophen     Tablet .. 650 milliGRAM(s) Oral every 6 hours PRN Mild Pain (1 - 3)  acetaminophen   IVPB .. 1000 milliGRAM(s) IV Intermittent every 6 hours PRN Severe Pain (7 - 10)  dextrose Oral Gel 15 Gram(s) Oral once PRN Blood Glucose LESS THAN 70 milliGRAM(s)/deciliter  lidocaine   4% Patch 1 Patch Transdermal daily PRN pain  ondansetron Injectable 4 milliGRAM(s) IV Push every 6 hours PRN Nausea and/or Vomiting

## 2024-10-20 NOTE — PROGRESS NOTE ADULT - SUBJECTIVE AND OBJECTIVE BOX
HPI:  29-year-old male with PSH of right L5-S1 hemilaminectomy for disc herniation with Dr. Sykes (10/2020) and repair of left bicep tendon (10/2023) presents to ED today after experiencing acute on chronic lower back pain with radiation to b/l gluteal muscles and b/l anterior thigh numbness/tingling x 3 days. Patient states symptoms worsen with ambulation and occur intermittently throughout the day, at the worst pain is 7/10 in the back. Patient states he has chronic numbness in right anteriolateral thigh and lateral aspect of right foot. Denies any recent trauma. States that pain in back is described as a tightening pain and was relieved after taking a muscle relaxer yesterday. Denies any epidural steroid injections. Denies any AC/AP use.  (19 Oct 2024 19:06)    INTERVAL HPI/OVERNIGHT EVENTS:  29y Male s/p __ seen lying comfortably in bed. Tolerating diet. Passing gas/BM. Voiding. Jones in with __ clear urine. Denies headache, weakness, numbness, n/v/d, fevers, chills, chest pain, SOB.     Vital Signs Last 24 Hrs  T(C): 36.6 (20 Oct 2024 04:35), Max: 36.7 (19 Oct 2024 13:52)  T(F): 97.9 (20 Oct 2024 04:35), Max: 98.1 (19 Oct 2024 13:52)  HR: 62 (20 Oct 2024 04:35) (62 - 85)  BP: 132/79 (20 Oct 2024 04:35) (126/77 - 158/84)  RR: 18 (20 Oct 2024 04:35) (16 - 18)  SpO2: 96% (20 Oct 2024 04:35) (95% - 98%)  Parameters below as of 20 Oct 2024 04:35  Patient On (Oxygen Delivery Method): room air    PHYSICAL EXAM:  GENERAL: NAD, well-groomed  HEAD: Atraumatic, normocephalic  MENTAL STATUS: AAO x3; awake; opens eyes spontaneously; appropriately conversant without aphasia; following simple commands  CRANIAL NERVES: PERRL. EOMI. Face grossly symmetric. Hearing grossly intact. Speech clear. Head turning and shoulder shrug intact.   MOTOR: Strength 5/5 throughout  SENSATION: Decreased sensation of right anteriolateral thigh and lateral right foot (baseline) otherwise intact  CHEST/LUNG: Non-labored breathing on room air  SKIN: Warm, dry    LABS:                        15.3   6.12  )-----------( 223      ( 20 Oct 2024 03:45 )             45.2     10-20    137  |  103  |  17.7  ----------------------------<  106[H]  4.2   |  23.0  |  0.77    Ca    8.7      20 Oct 2024 03:45  Phos  2.8     10-20  Mg     1.9     10-20    TPro  6.5[L]  /  Alb  4.0  /  TBili  0.6  /  DBili  x   /  AST  38  /  ALT  65[H]  /  AlkPhos  53  10-20    PT/INR - ( 20 Oct 2024 03:45 )   PT: 11.7 sec;   INR: 1.04 ratio    PTT - ( 20 Oct 2024 03:45 )  PTT:38.4 sec    Urinalysis Basic - ( 20 Oct 2024 03:45 )  Color: x / Appearance: x / SG: x / pH: x  Gluc: 106 mg/dL / Ketone: x  / Bili: x / Urobili: x   Blood: x / Protein: x / Nitrite: x   Leuk Esterase: x / RBC: x / WBC x   Sq Epi: x / Non Sq Epi: x / Bacteria: x      10-19 @ 07:01  -  10-20 @ 07:00  --------------------------------------------------------  IN: 90 mL / OUT: 0 mL / NET: 90 mL      RADIOLOGY & ADDITIONAL TESTS:    CT Lumbar Spine No Cont (10.19.24 @ 18:21)  IMPRESSION:  L5-S1 disc osteophyte complex with severe canal stenosis, worse on the   right side. Suspected impingement of the exiting right L5 nerve root.    MR Lumbar Spine No Cont (10.19.24 @ 15:35)  IMPRESSION:  L5-S1: Right hemilaminotomy. Disc osteophyte complex with large   central/right paracentral/foraminal disc extrusion, resulting in severe   spinal canal stenosis and severe right lateral recess stenosis with   impingement upon the right descending S1 nerve root. The disc osteophyte   complex also narrows the left lateral recess and contacts the left   descending S1 nerve root. Bilateral facet arthrosis. Moderate right and   mild left neural foraminal stenosis.   HPI:  29-year-old male with PSH of right L5-S1 hemilaminectomy for disc herniation with Dr. Sykes (10/2020) and repair of left bicep tendon (10/2023) presents to ED today after experiencing acute on chronic lower back pain with radiation to b/l gluteal muscles and b/l anterior thigh numbness/tingling x 3 days. Patient states symptoms worsen with ambulation and occur intermittently throughout the day, at the worst pain is 7/10 in the back. Patient states he has chronic numbness in right anteriolateral thigh and lateral aspect of right foot. Denies any recent trauma. States that pain in back is described as a tightening pain and was relieved after taking a muscle relaxer yesterday. Denies any epidural steroid injections. Denies any AC/AP use.  (19 Oct 2024 19:06)    INTERVAL HPI/OVERNIGHT EVENTS:  29-year-old male seen lying comfortably in bed this morning on rounds. Admits to lower back pain described as tightening pain 6/10 radiating down glutes. Admits to some relief with robaxin.     Vital Signs Last 24 Hrs  T(C): 36.6 (20 Oct 2024 04:35), Max: 36.7 (19 Oct 2024 13:52)  T(F): 97.9 (20 Oct 2024 04:35), Max: 98.1 (19 Oct 2024 13:52)  HR: 62 (20 Oct 2024 04:35) (62 - 85)  BP: 132/79 (20 Oct 2024 04:35) (126/77 - 158/84)  RR: 18 (20 Oct 2024 04:35) (16 - 18)  SpO2: 96% (20 Oct 2024 04:35) (95% - 98%)  Parameters below as of 20 Oct 2024 04:35  Patient On (Oxygen Delivery Method): room air    PHYSICAL EXAM:  GENERAL: NAD, well-groomed  HEAD: Atraumatic, normocephalic  MENTAL STATUS: AAO x3; awake; opens eyes spontaneously; appropriately conversant without aphasia; following simple commands  CRANIAL NERVES: PERRL. EOMI. Face grossly symmetric. Hearing grossly intact. Speech clear. Head turning and shoulder shrug intact.   MOTOR: Strength 5/5 throughout  SENSATION: Decreased sensation of right anteriolateral thigh and lateral right foot (baseline) otherwise intact  CHEST/LUNG: Non-labored breathing on room air  SKIN: Warm, dry    LABS:                        15.3   6.12  )-----------( 223      ( 20 Oct 2024 03:45 )             45.2     10-20    137  |  103  |  17.7  ----------------------------<  106[H]  4.2   |  23.0  |  0.77    Ca    8.7      20 Oct 2024 03:45  Phos  2.8     10-20  Mg     1.9     10-20    TPro  6.5[L]  /  Alb  4.0  /  TBili  0.6  /  DBili  x   /  AST  38  /  ALT  65[H]  /  AlkPhos  53  10-20    PT/INR - ( 20 Oct 2024 03:45 )   PT: 11.7 sec;   INR: 1.04 ratio    PTT - ( 20 Oct 2024 03:45 )  PTT:38.4 sec    Urinalysis Basic - ( 20 Oct 2024 03:45 )  Color: x / Appearance: x / SG: x / pH: x  Gluc: 106 mg/dL / Ketone: x  / Bili: x / Urobili: x   Blood: x / Protein: x / Nitrite: x   Leuk Esterase: x / RBC: x / WBC x   Sq Epi: x / Non Sq Epi: x / Bacteria: x      10-19 @ 07:01  -  10-20 @ 07:00  --------------------------------------------------------  IN: 90 mL / OUT: 0 mL / NET: 90 mL      RADIOLOGY & ADDITIONAL TESTS:    CT Lumbar Spine No Cont (10.19.24 @ 18:21)  IMPRESSION:  L5-S1 disc osteophyte complex with severe canal stenosis, worse on the   right side. Suspected impingement of the exiting right L5 nerve root.    MR Lumbar Spine No Cont (10.19.24 @ 15:35)  IMPRESSION:  L5-S1: Right hemilaminotomy. Disc osteophyte complex with large   central/right paracentral/foraminal disc extrusion, resulting in severe   spinal canal stenosis and severe right lateral recess stenosis with   impingement upon the right descending S1 nerve root. The disc osteophyte   complex also narrows the left lateral recess and contacts the left   descending S1 nerve root. Bilateral facet arthrosis. Moderate right and   mild left neural foraminal stenosis.   HPI:  29-year-old male with PSH of right L5-S1 hemilaminectomy for disc herniation with Dr. Sykes (10/2020) and repair of left bicep tendon (10/2023) presents to ED today after experiencing acute on chronic lower back pain with radiation to b/l gluteal muscles and b/l anterior thigh numbness/tingling x 3 days. Patient states symptoms worsen with ambulation and occur intermittently throughout the day, at the worst pain is 7/10 in the back. Patient states he has chronic numbness in right anteriolateral thigh and lateral aspect of right foot. Denies any recent trauma. States that pain in back is described as a tightening pain and was relieved after taking a muscle relaxer yesterday. Denies any epidural steroid injections. Denies any AC/AP use.  (19 Oct 2024 19:06)    INTERVAL HPI/OVERNIGHT EVENTS:  29-year-old male seen lying comfortably in bed this morning on rounds. Admits to lower back pain described as tightening pain 6/10 radiating down glutes. Admits to some relief with robaxin. Discussed case with [facility], patient will be pre-oped for tentative OR tuesday 10/22 with Dr. Sykes for L5-S1 PLIF. Patient informed with Dr. Woodson on rounds and seems amenable.     Vital Signs Last 24 Hrs  T(C): 36.6 (20 Oct 2024 04:35), Max: 36.7 (19 Oct 2024 13:52)  T(F): 97.9 (20 Oct 2024 04:35), Max: 98.1 (19 Oct 2024 13:52)  HR: 62 (20 Oct 2024 04:35) (62 - 85)  BP: 132/79 (20 Oct 2024 04:35) (126/77 - 158/84)  RR: 18 (20 Oct 2024 04:35) (16 - 18)  SpO2: 96% (20 Oct 2024 04:35) (95% - 98%)  Parameters below as of 20 Oct 2024 04:35  Patient On (Oxygen Delivery Method): room air    PHYSICAL EXAM:  GENERAL: NAD  HEAD: Atraumatic, normocephalic  MENTAL STATUS: AAO x3; awake; opens eyes spontaneously; appropriately conversant without aphasia; following simple commands  CRANIAL NERVES: PERRL. EOMI. Face grossly symmetric. Hearing grossly intact. Speech clear. Head turning and shoulder shrug intact.   MOTOR: Strength 5/5 throughout  SENSATION: Decreased sensation of right anteriolateral thigh and lateral right foot (baseline) otherwise intact  CHEST/LUNG: Non-labored breathing on room air  SKIN: Warm, dry    LABS:                        15.3   6.12  )-----------( 223      ( 20 Oct 2024 03:45 )             45.2     10-20    137  |  103  |  17.7  ----------------------------<  106[H]  4.2   |  23.0  |  0.77    Ca    8.7      20 Oct 2024 03:45  Phos  2.8     10-20  Mg     1.9     10-20    TPro  6.5[L]  /  Alb  4.0  /  TBili  0.6  /  DBili  x   /  AST  38  /  ALT  65[H]  /  AlkPhos  53  10-20    PT/INR - ( 20 Oct 2024 03:45 )   PT: 11.7 sec;   INR: 1.04 ratio    PTT - ( 20 Oct 2024 03:45 )  PTT:38.4 sec    Urinalysis Basic - ( 20 Oct 2024 03:45 )  Color: x / Appearance: x / SG: x / pH: x  Gluc: 106 mg/dL / Ketone: x  / Bili: x / Urobili: x   Blood: x / Protein: x / Nitrite: x   Leuk Esterase: x / RBC: x / WBC x   Sq Epi: x / Non Sq Epi: x / Bacteria: x      10-19 @ 07:01  -  10-20 @ 07:00  --------------------------------------------------------  IN: 90 mL / OUT: 0 mL / NET: 90 mL      RADIOLOGY & ADDITIONAL TESTS:    CT Lumbar Spine No Cont (10.19.24 @ 18:21)  IMPRESSION:  L5-S1 disc osteophyte complex with severe canal stenosis, worse on the   right side. Suspected impingement of the exiting right L5 nerve root.    MR Lumbar Spine No Cont (10.19.24 @ 15:35)  IMPRESSION:  L5-S1: Right hemilaminotomy. Disc osteophyte complex with large   central/right paracentral/foraminal disc extrusion, resulting in severe   spinal canal stenosis and severe right lateral recess stenosis with   impingement upon the right descending S1 nerve root. The disc osteophyte   complex also narrows the left lateral recess and contacts the left   descending S1 nerve root. Bilateral facet arthrosis. Moderate right and   mild left neural foraminal stenosis.

## 2024-10-20 NOTE — PROGRESS NOTE ADULT - ASSESSMENT
29M with PSH of right L5-S1 hemilaminectomy for disc herniation with Dr. Sykes (10/2020) presents to ED today after experiencing acute on chronic lower back pain with radiation to b/l gluteal muscles and b/l anterior thigh numbness/tingling x 3 days, R>L found to have disc extrusion at L5-S1 on MRI L spine.    Plan:  - Q4h neuro checks  - Decadron 4mg q6h   - Robaxin 750 q8h PRN muscle spasm  - Pain control Tylenol, Oxy PRN, Lidocaine patches PRN  - Pre-op labs pending  - Normotensive SBP goal  - ISS and PPI while on steroids  - Bowel regimen: senna, miralax  - DVT ppx: SCDs, SQL 40 BID  - Discussed with Dr. Woodson       Incomplete note 29M with PSH of right L5-S1 hemilaminectomy for disc herniation with Dr. Sykes (10/2020) presents to ED today after experiencing acute on chronic lower back pain with radiation to b/l gluteal muscles and b/l anterior thigh numbness/tingling x 3 days, R>L found to have disc extrusion at L5-S1 on MRI L spine.    Plan:  - Q4h neuro checks  - Decadron 4mg q6h   - Robaxin 750 q8h PRN muscle spasm  - Pain control Tylenol, Oxy PRN, Lidocaine patches PRN  - Pre-op labs pending  - EKG, CXR pending for pre-op  - Normotensive SBP goal  - ISS and PPI while on steroids  - Bowel regimen: senna, miralax  - DVT ppx: SCDs, SQL 40 BID  - Discussed with Dr. Woodson     Incomplete note  Incomplete note 29M with PSH of right L5-S1 hemilaminectomy for disc herniation with Dr. Sykes (10/2020) presents to ED today after experiencing acute on chronic lower back pain with radiation to b/l gluteal muscles and b/l anterior thigh numbness/tingling x 3 days, R>L found to have disc extrusion at L5-S1 on MRI L spine.    Plan:  - Q4h neuro checks  - All imaging reviewed with attendings  - Planned for tentative OR Tues 10/22   - Decadron 4mg q6h   - Robaxin 750 q8h PRN muscle spasm  - Pain control Tylenol, Oxy PRN, Lidocaine patches PRN  - Pre-op labs pending  - EKG, CXR pending for pre-op  - Normotensive SBP goal  - ISS and PPI while on steroids  - Bowel regimen: senna, miralax  - DVT ppx: SCDs, SQL 40 BID  - Discussed with Dr. Woodson     Incomplete note  Incomplete note 29M with PSH of right L5-S1 hemilaminectomy for disc herniation with Dr. Sykes (10/2020) presents to ED today after experiencing acute on chronic lower back pain with radiation to b/l gluteal muscles and b/l anterior thigh numbness/tingling x 3 days, R>L found to have disc extrusion at L5-S1 on MRI L spine.    Plan:  - Q4h neuro checks  - All imaging reviewed with attendings  - Planned for tentative OR Tues 10/22 for L5-S1 PLIF with Dr. Sykes  - Requested medicine consult for optimization/clearance for OR  - Will need to be pre-oped Monday   - Decadron 4mg q6h   - Robaxin 750 q8h PRN muscle spasm  - Pain control Tylenol, Oxy PRN, Lidocaine patches PRN  - Normotensive SBP goal  - ISS and PPI while on steroids  - Bowel regimen: senna, miralax  - DVT ppx: SCDs, SQL 40 BID  - Discussed with Dr. Woodson

## 2024-10-21 DIAGNOSIS — D72.829 ELEVATED WHITE BLOOD CELL COUNT, UNSPECIFIED: ICD-10-CM

## 2024-10-21 DIAGNOSIS — E66.01 MORBID (SEVERE) OBESITY DUE TO EXCESS CALORIES: ICD-10-CM

## 2024-10-21 DIAGNOSIS — M51.26 OTHER INTERVERTEBRAL DISC DISPLACEMENT, LUMBAR REGION: ICD-10-CM

## 2024-10-21 LAB
ANION GAP SERPL CALC-SCNC: 13 MMOL/L — SIGNIFICANT CHANGE UP (ref 5–17)
BLD GP AB SCN SERPL QL: SIGNIFICANT CHANGE UP
BUN SERPL-MCNC: 17.1 MG/DL — SIGNIFICANT CHANGE UP (ref 8–20)
CALCIUM SERPL-MCNC: 9 MG/DL — SIGNIFICANT CHANGE UP (ref 8.4–10.5)
CHLORIDE SERPL-SCNC: 102 MMOL/L — SIGNIFICANT CHANGE UP (ref 96–108)
CO2 SERPL-SCNC: 22 MMOL/L — SIGNIFICANT CHANGE UP (ref 22–29)
CREAT SERPL-MCNC: 0.77 MG/DL — SIGNIFICANT CHANGE UP (ref 0.5–1.3)
EGFR: 124 ML/MIN/1.73M2 — SIGNIFICANT CHANGE UP
GLUCOSE BLDC GLUCOMTR-MCNC: 119 MG/DL — HIGH (ref 70–99)
GLUCOSE BLDC GLUCOMTR-MCNC: 128 MG/DL — HIGH (ref 70–99)
GLUCOSE BLDC GLUCOMTR-MCNC: 134 MG/DL — HIGH (ref 70–99)
GLUCOSE BLDC GLUCOMTR-MCNC: 184 MG/DL — HIGH (ref 70–99)
GLUCOSE SERPL-MCNC: 133 MG/DL — HIGH (ref 70–99)
HCT VFR BLD CALC: 44.2 % — SIGNIFICANT CHANGE UP (ref 39–50)
HGB BLD-MCNC: 15.1 G/DL — SIGNIFICANT CHANGE UP (ref 13–17)
MAGNESIUM SERPL-MCNC: 2 MG/DL — SIGNIFICANT CHANGE UP (ref 1.8–2.6)
MCHC RBC-ENTMCNC: 28.8 PG — SIGNIFICANT CHANGE UP (ref 27–34)
MCHC RBC-ENTMCNC: 34.2 GM/DL — SIGNIFICANT CHANGE UP (ref 32–36)
MCV RBC AUTO: 84.4 FL — SIGNIFICANT CHANGE UP (ref 80–100)
MRSA PCR RESULT.: SIGNIFICANT CHANGE UP
PHOSPHATE SERPL-MCNC: 3.5 MG/DL — SIGNIFICANT CHANGE UP (ref 2.4–4.7)
PLATELET # BLD AUTO: 245 K/UL — SIGNIFICANT CHANGE UP (ref 150–400)
POTASSIUM SERPL-MCNC: 4.3 MMOL/L — SIGNIFICANT CHANGE UP (ref 3.5–5.3)
POTASSIUM SERPL-SCNC: 4.3 MMOL/L — SIGNIFICANT CHANGE UP (ref 3.5–5.3)
RBC # BLD: 5.24 M/UL — SIGNIFICANT CHANGE UP (ref 4.2–5.8)
RBC # FLD: 13.2 % — SIGNIFICANT CHANGE UP (ref 10.3–14.5)
S AUREUS DNA NOSE QL NAA+PROBE: SIGNIFICANT CHANGE UP
SODIUM SERPL-SCNC: 137 MMOL/L — SIGNIFICANT CHANGE UP (ref 135–145)
WBC # BLD: 11.55 K/UL — HIGH (ref 3.8–10.5)
WBC # FLD AUTO: 11.55 K/UL — HIGH (ref 3.8–10.5)

## 2024-10-21 PROCEDURE — 99232 SBSQ HOSP IP/OBS MODERATE 35: CPT

## 2024-10-21 PROCEDURE — 93010 ELECTROCARDIOGRAM REPORT: CPT

## 2024-10-21 PROCEDURE — 71045 X-RAY EXAM CHEST 1 VIEW: CPT | Mod: 26

## 2024-10-21 PROCEDURE — 93970 EXTREMITY STUDY: CPT | Mod: 26

## 2024-10-21 RX ORDER — TRANEXAMIC ACID 650 MG/1
1000 TABLET ORAL ONCE
Refills: 0 | Status: DISCONTINUED | OUTPATIENT
Start: 2024-10-22 | End: 2024-10-22

## 2024-10-21 RX ORDER — POVIDONE-IODINE 0.07 MG/ML
1 SOLUTION TOPICAL ONCE
Refills: 0 | Status: DISCONTINUED | OUTPATIENT
Start: 2024-10-21 | End: 2024-10-21

## 2024-10-21 RX ORDER — SODIUM CHLORIDE 9 MG/ML
1000 INJECTION, SOLUTION INTRAMUSCULAR; INTRAVENOUS; SUBCUTANEOUS
Refills: 0 | Status: DISCONTINUED | OUTPATIENT
Start: 2024-10-21 | End: 2024-10-22

## 2024-10-21 RX ORDER — CHLORHEXIDINE GLUCONATE 40 MG/ML
1 SOLUTION TOPICAL ONCE
Refills: 0 | Status: DISCONTINUED | OUTPATIENT
Start: 2024-10-21 | End: 2024-10-22

## 2024-10-21 RX ORDER — POVIDONE-IODINE 0.07 MG/ML
1 SOLUTION TOPICAL ONCE
Refills: 0 | Status: COMPLETED | OUTPATIENT
Start: 2024-10-22 | End: 2024-10-22

## 2024-10-21 RX ADMIN — METHOCARBAMOL 750 MILLIGRAM(S): 500 TABLET ORAL at 13:22

## 2024-10-21 RX ADMIN — DEXAMETHASONE 1.5 MG 4 MILLIGRAM(S): 1.5 TABLET ORAL at 05:39

## 2024-10-21 RX ADMIN — SODIUM CHLORIDE 75 MILLILITER(S): 9 INJECTION, SOLUTION INTRAMUSCULAR; INTRAVENOUS; SUBCUTANEOUS at 23:07

## 2024-10-21 RX ADMIN — Medication 2 TABLET(S): at 21:34

## 2024-10-21 RX ADMIN — METHOCARBAMOL 750 MILLIGRAM(S): 500 TABLET ORAL at 05:39

## 2024-10-21 RX ADMIN — DEXAMETHASONE 1.5 MG 4 MILLIGRAM(S): 1.5 TABLET ORAL at 11:28

## 2024-10-21 RX ADMIN — Medication 1 TABLET(S): at 11:28

## 2024-10-21 RX ADMIN — DEXAMETHASONE 1.5 MG 4 MILLIGRAM(S): 1.5 TABLET ORAL at 23:07

## 2024-10-21 RX ADMIN — METHOCARBAMOL 750 MILLIGRAM(S): 500 TABLET ORAL at 21:35

## 2024-10-21 RX ADMIN — PANTOPRAZOLE SODIUM 40 MILLIGRAM(S): 40 TABLET, DELAYED RELEASE ORAL at 11:28

## 2024-10-21 RX ADMIN — DEXAMETHASONE 1.5 MG 4 MILLIGRAM(S): 1.5 TABLET ORAL at 17:19

## 2024-10-21 RX ADMIN — POLYETHYLENE GLYCOL 3350 17 GRAM(S): 17 POWDER, FOR SOLUTION ORAL at 11:29

## 2024-10-21 NOTE — PROGRESS NOTE ADULT - SUBJECTIVE AND OBJECTIVE BOX
HOSPITALIST PROGRESS NOTE    SINTIA COLLINS  [id number]  29yMale    Patient is a 29y old  Male who presents with a chief complaint of L5-S1 disc herniation (21 Oct 2024 11:09)      SUBJECTIVE:   Chart reviewed since admission.    29 year old morbidly obese male with prior L5S1 laminectomy presented with lower back pain and right leg paresthesia x3 days.  Imaging with L5S1 herniation; patient scheduled for OR (10/22/24)    Patient seen and examined at bedside for disc herniation  Currently pain 4/10 as compared to 6/10 yesterday  Ambulating, voiding and has had bowel movement      OBJECTIVE:  Vital Signs Last 24 Hrs  T(C): 36.7 (21 Oct 2024 12:42), Max: 37 (21 Oct 2024 08:38)  T(F): 98.1 (21 Oct 2024 12:42), Max: 98.6 (21 Oct 2024 08:38)  HR: 107 (21 Oct 2024 12:42) (78 - 107)  BP: 180/78 (21 Oct 2024 12:42) (104/54 - 180/78)   RR: 17 (21 Oct 2024 12:42) (17 - 18)  SpO2: 94% (21 Oct 2024 12:42) (92% - 99%)    Parameters below as of 21 Oct 2024 12:42  Patient On (Oxygen Delivery Method): room air        PHYSICAL EXAMINATION  BMI 43.4  General: Obese male sitting up in chair about to eat lunch  HEENT:  Anicteric sclera  NECK:  Supple  CVS: regular rate and rhythm S1 S2  RESP:  Clear to auscultation bilaterally  GI:  Soft nondistended nontender BS+  : No suprapubic or CVA tenderness  MSK:  Moves all extremities without any difficulty though raising right lower extremity causes back pain  CNS:  Awake, alert oriented x 3. Asymmetric sensation in lower extremities (Chronic for prior surgery as per patient)  INTEG:  Warm dry skin  PSYCH:  Fair mood    MONITOR:  CAPILLARY BLOOD GLUCOSE      POCT Blood Glucose.: 134 mg/dL (21 Oct 2024 11:26)  POCT Blood Glucose.: 119 mg/dL (21 Oct 2024 08:07)  POCT Blood Glucose.: 146 mg/dL (20 Oct 2024 22:06)  POCT Blood Glucose.: 158 mg/dL (20 Oct 2024 18:05)  A1C with Estimated Average Glucose Result: 5.0 % (10.20.24 @ 03:45)     I&O's Summary    20 Oct 2024 07:01  -  21 Oct 2024 07:00  --------------------------------------------------------  IN: 250 mL / OUT: 0 mL / NET: 250 mL                            15.1   11.55 )-----------( 245      ( 21 Oct 2024 05:07 )             44.2     PT/INR - ( 20 Oct 2024 03:45 )   PT: 11.7 sec;   INR: 1.04 ratio         PTT - ( 20 Oct 2024 03:45 )  PTT:38.4 sec  10-21    137  |  102  |  17.1  ----------------------------<  133[H]  4.3   |  22.0  |  0.77    Ca    9.0      21 Oct 2024 05:07  Phos  3.5     10-21  Mg     2.0     10-21    TPro  6.5[L]  /  Alb  4.0  /  TBili  0.6  /  DBili  x   /  AST  38  /  ALT  65[H]  /  AlkPhos  53  10-20        Urinalysis Basic - ( 21 Oct 2024 05:07 )    Color: x / Appearance: x / SG: x / pH: x  Gluc: 133 mg/dL / Ketone: x  / Bili: x / Urobili: x   Blood: x / Protein: x / Nitrite: x   Leuk Esterase: x / RBC: x / WBC x   Sq Epi: x / Non Sq Epi: x / Bacteria: x            TTE:    RADIOLOGY        MEDICATIONS  (STANDING):  chlorhexidine 4% Liquid 1 Application(s) Topical once  dexAMETHasone     Tablet 4 milliGRAM(s) Oral every 6 hours  dextrose 5%. 1000 milliLiter(s) (50 mL/Hr) IV Continuous <Continuous>  dextrose 5%. 1000 milliLiter(s) (100 mL/Hr) IV Continuous <Continuous>  dextrose 50% Injectable 12.5 Gram(s) IV Push once  dextrose 50% Injectable 25 Gram(s) IV Push once  dextrose 50% Injectable 25 Gram(s) IV Push once  glucagon  Injectable 1 milliGRAM(s) IntraMuscular once  influenza   Vaccine 0.5 milliLiter(s) IntraMuscular once  insulin lispro (ADMELOG) corrective regimen sliding scale   SubCutaneous at bedtime  insulin lispro (ADMELOG) corrective regimen sliding scale   SubCutaneous three times a day before meals  methocarbamol 750 milliGRAM(s) Oral three times a day  multivitamin 1 Tablet(s) Oral daily  pantoprazole  Injectable 40 milliGRAM(s) IV Push daily  polyethylene glycol 3350 17 Gram(s) Oral daily  povidone iodine 10% Solution 1 Application(s) Topical once  senna 2 Tablet(s) Oral at bedtime  sodium chloride 0.9%. 1000 milliLiter(s) (75 mL/Hr) IV Continuous <Continuous>      MEDICATIONS  (PRN):  acetaminophen     Tablet .. 650 milliGRAM(s) Oral every 6 hours PRN Mild Pain (1 - 3)  acetaminophen   IVPB .. 1000 milliGRAM(s) IV Intermittent every 6 hours PRN Severe Pain (7 - 10)  dextrose Oral Gel 15 Gram(s) Oral once PRN Blood Glucose LESS THAN 70 milliGRAM(s)/deciliter  lidocaine   4% Patch 1 Patch Transdermal daily PRN pain  ondansetron Injectable 4 milliGRAM(s) IV Push every 6 hours PRN Nausea and/or Vomiting

## 2024-10-21 NOTE — PROGRESS NOTE ADULT - ASSESSMENT
30 y/o male with Hx of right L5-S1 hemilaminectomy for disc herniation with Dr. Sykes (10/2020) and repair of left bicep tendon (10/2023), he came to ED after experiencing acute on chronic lower back pain with radiation to b/l gluteal muscles and b/l anterior thigh numbness/tingling x 3 days. Patient states symptoms worsen with ambulation and occur intermittently throughout the day, denies any recent trauma, admitted under Neurosurgery team, medicine consulted for Medical management.      # Lower back pain due to herniated disc  - VS stable  - Pre op abx per primary team   - IVF when NPO  - opiate induced constipation regimen   - encouraging incentive spirometry   -DVT prophylaxis and Pain meds as per primary team   -PT/OT and weight bearing per primary team   Patient denies Hx of exertional dysnea or chest pain, no personal or family hx of easy bleeding, Patient's METS Score is >4 and RCRI is 0 patient labs, EKG reviewed, patient is at low risk for perioperative cardiovascular complication and is optimized from the medicine point of view for planned procedure.     # Leukocytosis  Likely due to steroids  No focus of infection  - Monitor WBC    # Obesity. BMI 43.4  - Consider Nutrition consult and bariatric consult

## 2024-10-21 NOTE — PROGRESS NOTE ADULT - SUBJECTIVE AND OBJECTIVE BOX
HPI:  29-year-old male with PSH of right L5-S1 hemilaminectomy for disc herniation with Dr. Sykes (10/2020) and repair of left bicep tendon (10/2023) presents to ED today after experiencing acute on chronic lower back pain with radiation to b/l gluteal muscles and b/l anterior thigh numbness/tingling x 3 days. Patient states symptoms worsen with ambulation and occur intermittently throughout the day, at the worst pain is 7/10 in the back. Patient states he has chronic numbness in right anteriolateral thigh and lateral aspect of right foot. Denies any recent trauma. States that pain in back is described as a tightening pain and was relieved after taking a muscle relaxer yesterday. Denies any epidural steroid injections. Denies any AC/AP use.  (19 Oct 2024 19:06)    INTERVAL HPI/OVERNIGHT EVENTS:  29-year-old male seen lying comfortably in bed this morning on rounds. Admits to lower back pain described as tightening pain 6/10 radiating down b/l glutes. Discussed case with Onel, patient will be pre-oped for OR tomorrow 10/22 with Dr. Sykes for L5-S1 PLIF. Patient informed with Dr. Sykes on rounds and is amenable.     Vital Signs Last 24 Hrs  T(C): 37 (21 Oct 2024 08:38), Max: 37 (21 Oct 2024 08:38)  T(F): 98.6 (21 Oct 2024 08:38), Max: 98.6 (21 Oct 2024 08:38)  HR: 78 (21 Oct 2024 08:38) (70 - 102)  BP: 135/77 (21 Oct 2024 08:38) (104/54 - 160/80)  BP(mean): --  RR: 17 (21 Oct 2024 08:38) (17 - 18)  SpO2: 99% (21 Oct 2024 08:38) (92% - 99%)    Parameters below as of 21 Oct 2024 08:38  Patient On (Oxygen Delivery Method): room air    PHYSICAL EXAM:  GENERAL: NAD  HEAD: Atraumatic, normocephalic  MENTAL STATUS: AAO x3; awake; opens eyes spontaneously; appropriately conversant without aphasia; following simple commands  CRANIAL NERVES: PERRL. EOMI. Face grossly symmetric. Hearing grossly intact. Speech clear. Head turning and shoulder shrug intact.   MOTOR: Strength 5/5 throughout  SENSATION: Decreased sensation of right anteriolateral thigh and lateral right foot (baseline), otherwise intact  CHEST/LUNG: Non-labored breathing on room air  SKIN: Warm, dry    LABS:                        15.1   11.55 )-----------( 245      ( 21 Oct 2024 05:07 )             44.2     10-21    137  |  102  |  17.1  ----------------------------<  133[H]  4.3   |  22.0  |  0.77    Ca    9.0      21 Oct 2024 05:07  Phos  3.5     10-21  Mg     2.0     10-21    TPro  6.5[L]  /  Alb  4.0  /  TBili  0.6  /  DBili  x   /  AST  38  /  ALT  65[H]  /  AlkPhos  53  10-20    PT/INR - ( 20 Oct 2024 03:45 )   PT: 11.7 sec;   INR: 1.04 ratio         PTT - ( 20 Oct 2024 03:45 )  PTT:38.4 sec  Urinalysis Basic - ( 21 Oct 2024 05:07 )    Color: x / Appearance: x / SG: x / pH: x  Gluc: 133 mg/dL / Ketone: x  / Bili: x / Urobili: x   Blood: x / Protein: x / Nitrite: x   Leuk Esterase: x / RBC: x / WBC x   Sq Epi: x / Non Sq Epi: x / Bacteria: x      10-20 @ 07:01  -  10-21 @ 07:00  --------------------------------------------------------  IN: 250 mL / OUT: 0 mL / NET: 250 mL    RADIOLOGY & ADDITIONAL TESTS:  CT Lumbar Spine No Cont (10.19.24 @ 18:21)  IMPRESSION:  L5-S1 disc osteophyte complex with severe canal stenosis, worse on the   right side. Suspected impingement of the exiting right L5 nerve root.    MR Lumbar Spine No Cont (10.19.24 @ 15:35)  IMPRESSION:  L5-S1: Right hemilaminotomy. Disc osteophyte complex with large   central/right paracentral/foraminal disc extrusion, resulting in severe   spinal canal stenosis and severe right lateral recess stenosis with   impingement upon the right descending S1 nerve root. The disc osteophyte   complex also narrows the left lateral recess and contacts the left   descending S1 nerve root. Bilateral facet arthrosis. Moderate right and   mild left neural foraminal stenosis.

## 2024-10-21 NOTE — PROGRESS NOTE ADULT - ASSESSMENT
29M with PSH of right L5-S1 hemilaminectomy for disc herniation with Dr. Sykes (10/2020) presents to ED today after experiencing acute on chronic lower back pain with radiation to b/l gluteal muscles and b/l anterior thigh numbness/tingling x 3 days, R>L found to have disc extrusion at L5-S1 on MRI L spine.    Plan:  - Q4h neuro checks  - Normotensive SBP goal  - All imaging reviewed with attendings  - Booked for OR tomorrow 10/22 for L5-S1 PLIF with Dr. Sykes  - Medicine cleared for OR  - Pre-op'd today: NPO at midnight  - Decadron 4mg q6h   - Robaxin 750 q8h PRN muscle spasm  - Pain control Tylenol, Oxy PRN, Lidocaine patches PRN  - ISS and PPI while on steroids  - Bowel regimen: senna, miralax  - DVT ppx: SCDs, SQ Lovenox discontinued for OR 10/22  - Discussed with Dr. Sykes

## 2024-10-22 ENCOUNTER — TRANSCRIPTION ENCOUNTER (OUTPATIENT)
Age: 29
End: 2024-10-22

## 2024-10-22 LAB
ANION GAP SERPL CALC-SCNC: 12 MMOL/L — SIGNIFICANT CHANGE UP (ref 5–17)
BUN SERPL-MCNC: 17.5 MG/DL — SIGNIFICANT CHANGE UP (ref 8–20)
CALCIUM SERPL-MCNC: 8.7 MG/DL — SIGNIFICANT CHANGE UP (ref 8.4–10.5)
CHLORIDE SERPL-SCNC: 104 MMOL/L — SIGNIFICANT CHANGE UP (ref 96–108)
CO2 SERPL-SCNC: 23 MMOL/L — SIGNIFICANT CHANGE UP (ref 22–29)
CREAT SERPL-MCNC: 0.73 MG/DL — SIGNIFICANT CHANGE UP (ref 0.5–1.3)
EGFR: 126 ML/MIN/1.73M2 — SIGNIFICANT CHANGE UP
GLUCOSE BLDC GLUCOMTR-MCNC: 113 MG/DL — HIGH (ref 70–99)
GLUCOSE BLDC GLUCOMTR-MCNC: 115 MG/DL — HIGH (ref 70–99)
GLUCOSE BLDC GLUCOMTR-MCNC: 126 MG/DL — HIGH (ref 70–99)
GLUCOSE BLDC GLUCOMTR-MCNC: 129 MG/DL — HIGH (ref 70–99)
GLUCOSE SERPL-MCNC: 132 MG/DL — HIGH (ref 70–99)
HCT VFR BLD CALC: 44.6 % — SIGNIFICANT CHANGE UP (ref 39–50)
HGB BLD-MCNC: 15.1 G/DL — SIGNIFICANT CHANGE UP (ref 13–17)
MAGNESIUM SERPL-MCNC: 2.1 MG/DL — SIGNIFICANT CHANGE UP (ref 1.6–2.6)
MCHC RBC-ENTMCNC: 28.7 PG — SIGNIFICANT CHANGE UP (ref 27–34)
MCHC RBC-ENTMCNC: 33.9 GM/DL — SIGNIFICANT CHANGE UP (ref 32–36)
MCV RBC AUTO: 84.6 FL — SIGNIFICANT CHANGE UP (ref 80–100)
PHOSPHATE SERPL-MCNC: 3.4 MG/DL — SIGNIFICANT CHANGE UP (ref 2.4–4.7)
PLATELET # BLD AUTO: 268 K/UL — SIGNIFICANT CHANGE UP (ref 150–400)
POTASSIUM SERPL-MCNC: 4.3 MMOL/L — SIGNIFICANT CHANGE UP (ref 3.5–5.3)
POTASSIUM SERPL-SCNC: 4.3 MMOL/L — SIGNIFICANT CHANGE UP (ref 3.5–5.3)
RBC # BLD: 5.27 M/UL — SIGNIFICANT CHANGE UP (ref 4.2–5.8)
RBC # FLD: 13.3 % — SIGNIFICANT CHANGE UP (ref 10.3–14.5)
SODIUM SERPL-SCNC: 139 MMOL/L — SIGNIFICANT CHANGE UP (ref 135–145)
WBC # BLD: 11.65 K/UL — HIGH (ref 3.8–10.5)
WBC # FLD AUTO: 11.65 K/UL — HIGH (ref 3.8–10.5)

## 2024-10-22 PROCEDURE — 99232 SBSQ HOSP IP/OBS MODERATE 35: CPT

## 2024-10-22 DEVICE — IMPLANTABLE DEVICE: Type: IMPLANTABLE DEVICE | Status: FUNCTIONAL

## 2024-10-22 DEVICE — CAP LOKG CREO THRDED 5.5MM: Type: IMPLANTABLE DEVICE | Status: FUNCTIONAL

## 2024-10-22 DEVICE — SURGIFOAM PAD 8CM X 12.5CM X 10MM (100): Type: IMPLANTABLE DEVICE | Status: FUNCTIONAL

## 2024-10-22 DEVICE — SURGIFLO MATRIX WITH THROMBIN KIT: Type: IMPLANTABLE DEVICE | Status: FUNCTIONAL

## 2024-10-22 DEVICE — GRAFT BONE INFUSE KIT MED: Type: IMPLANTABLE DEVICE | Status: FUNCTIONAL

## 2024-10-22 RX ORDER — PANTOPRAZOLE SODIUM 40 MG/1
40 TABLET, DELAYED RELEASE ORAL
Refills: 0 | Status: DISCONTINUED | OUTPATIENT
Start: 2024-10-22 | End: 2024-10-27

## 2024-10-22 RX ORDER — HYDROMORPHONE HCL/0.9% NACL/PF 6 MG/30 ML
1 PATIENT CONTROLLED ANALGESIA SYRINGE INTRAVENOUS ONCE
Refills: 0 | Status: DISCONTINUED | OUTPATIENT
Start: 2024-10-22 | End: 2024-10-22

## 2024-10-22 RX ORDER — OXYCODONE HYDROCHLORIDE 30 MG/1
5 TABLET ORAL EVERY 4 HOURS
Refills: 0 | Status: DISCONTINUED | OUTPATIENT
Start: 2024-10-22 | End: 2024-10-27

## 2024-10-22 RX ORDER — LABETALOL HCL 200 MG
10 TABLET ORAL
Refills: 0 | Status: DISCONTINUED | OUTPATIENT
Start: 2024-10-22 | End: 2024-10-27

## 2024-10-22 RX ORDER — GLUCAGON INJECTION, SOLUTION 1 MG/.2ML
1 INJECTION, SOLUTION SUBCUTANEOUS ONCE
Refills: 0 | Status: DISCONTINUED | OUTPATIENT
Start: 2024-10-22 | End: 2024-10-27

## 2024-10-22 RX ORDER — OXYCODONE HYDROCHLORIDE 30 MG/1
10 TABLET ORAL EVERY 4 HOURS
Refills: 0 | Status: DISCONTINUED | OUTPATIENT
Start: 2024-10-22 | End: 2024-10-27

## 2024-10-22 RX ORDER — ACETAMINOPHEN 500 MG
1000 TABLET ORAL ONCE
Refills: 0 | Status: COMPLETED | OUTPATIENT
Start: 2024-10-22 | End: 2024-10-22

## 2024-10-22 RX ORDER — DEXAMETHASONE 1.5 MG 1.5 MG/1
4 TABLET ORAL EVERY 6 HOURS
Refills: 0 | Status: DISCONTINUED | OUTPATIENT
Start: 2024-10-22 | End: 2024-10-23

## 2024-10-22 RX ORDER — ACETAMINOPHEN 500 MG
975 TABLET ORAL EVERY 6 HOURS
Refills: 0 | Status: DISCONTINUED | OUTPATIENT
Start: 2024-10-22 | End: 2024-10-27

## 2024-10-22 RX ORDER — B-COMPLEX WITH VITAMIN C
1 VIAL (ML) INJECTION DAILY
Refills: 0 | Status: DISCONTINUED | OUTPATIENT
Start: 2024-10-22 | End: 2024-10-27

## 2024-10-22 RX ORDER — ONDANSETRON HYDROCHLORIDE 2 MG/ML
4 INJECTION, SOLUTION INTRAMUSCULAR; INTRAVENOUS ONCE
Refills: 0 | Status: DISCONTINUED | OUTPATIENT
Start: 2024-10-22 | End: 2024-10-22

## 2024-10-22 RX ORDER — HYDRALAZINE HYDROCHLORIDE 50 MG/1
10 TABLET, FILM COATED ORAL
Refills: 0 | Status: DISCONTINUED | OUTPATIENT
Start: 2024-10-22 | End: 2024-10-27

## 2024-10-22 RX ORDER — CEFAZOLIN SODIUM 1 G
3000 VIAL (EA) INJECTION ONCE
Refills: 0 | Status: DISCONTINUED | OUTPATIENT
Start: 2024-10-22 | End: 2024-10-22

## 2024-10-22 RX ORDER — SODIUM CHLORIDE 9 MG/ML
1000 INJECTION, SOLUTION INTRAMUSCULAR; INTRAVENOUS; SUBCUTANEOUS
Refills: 0 | Status: DISCONTINUED | OUTPATIENT
Start: 2024-10-22 | End: 2024-10-25

## 2024-10-22 RX ORDER — FENTANYL CITRAT/DEXTROSE 5%/PF 1250MCG/50
25 PATIENT CONTROLLED ANALGESIA SYRINGE INTRAVENOUS
Refills: 0 | Status: DISCONTINUED | OUTPATIENT
Start: 2024-10-22 | End: 2024-10-22

## 2024-10-22 RX ORDER — CYCLOBENZAPRINE HYDROCHLORIDE 30 MG/1
10 CAPSULE, EXTENDED RELEASE ORAL EVERY 8 HOURS
Refills: 0 | Status: DISCONTINUED | OUTPATIENT
Start: 2024-10-22 | End: 2024-10-27

## 2024-10-22 RX ORDER — POLYETHYLENE GLYCOL 3350 17 G/17G
17 POWDER, FOR SOLUTION ORAL AT BEDTIME
Refills: 0 | Status: DISCONTINUED | OUTPATIENT
Start: 2024-10-22 | End: 2024-10-27

## 2024-10-22 RX ORDER — VANCOMYCIN HYDROCHLORIDE 50 MG/ML
1500 KIT ORAL EVERY 12 HOURS
Refills: 0 | Status: DISCONTINUED | OUTPATIENT
Start: 2024-10-23 | End: 2024-10-23

## 2024-10-22 RX ORDER — INSULIN LISPRO 100/ML
VIAL (ML) SUBCUTANEOUS
Refills: 0 | Status: DISCONTINUED | OUTPATIENT
Start: 2024-10-22 | End: 2024-10-27

## 2024-10-22 RX ORDER — LIDOCAINE HYDROCHLORIDE 40 MG/ML
1 SOLUTION TOPICAL EVERY 24 HOURS
Refills: 0 | Status: DISCONTINUED | OUTPATIENT
Start: 2024-10-22 | End: 2024-10-27

## 2024-10-22 RX ORDER — HYDROMORPHONE HCL/0.9% NACL/PF 6 MG/30 ML
0.5 PATIENT CONTROLLED ANALGESIA SYRINGE INTRAVENOUS
Refills: 0 | Status: DISCONTINUED | OUTPATIENT
Start: 2024-10-22 | End: 2024-10-22

## 2024-10-22 RX ORDER — ENOXAPARIN SODIUM 40MG/0.4ML
40 SYRINGE (ML) SUBCUTANEOUS EVERY 12 HOURS
Refills: 0 | Status: DISCONTINUED | OUTPATIENT
Start: 2024-10-23 | End: 2024-10-27

## 2024-10-22 RX ORDER — HYDROMORPHONE HCL/0.9% NACL/PF 6 MG/30 ML
0.5 PATIENT CONTROLLED ANALGESIA SYRINGE INTRAVENOUS EVERY 4 HOURS
Refills: 0 | Status: DISCONTINUED | OUTPATIENT
Start: 2024-10-22 | End: 2024-10-27

## 2024-10-22 RX ORDER — SENNA 187 MG
2 TABLET ORAL AT BEDTIME
Refills: 0 | Status: DISCONTINUED | OUTPATIENT
Start: 2024-10-22 | End: 2024-10-27

## 2024-10-22 RX ADMIN — Medication 975 MILLIGRAM(S): at 17:40

## 2024-10-22 RX ADMIN — Medication 1 MILLIGRAM(S): at 15:36

## 2024-10-22 RX ADMIN — SODIUM CHLORIDE 70 MILLILITER(S): 9 INJECTION, SOLUTION INTRAMUSCULAR; INTRAVENOUS; SUBCUTANEOUS at 17:40

## 2024-10-22 RX ADMIN — POLYETHYLENE GLYCOL 3350 17 GRAM(S): 17 POWDER, FOR SOLUTION ORAL at 21:46

## 2024-10-22 RX ADMIN — POVIDONE-IODINE 1 APPLICATION(S): 0.07 SOLUTION TOPICAL at 10:06

## 2024-10-22 RX ADMIN — Medication 1 MILLIGRAM(S): at 16:00

## 2024-10-22 RX ADMIN — Medication 975 MILLIGRAM(S): at 18:40

## 2024-10-22 RX ADMIN — VANCOMYCIN HYDROCHLORIDE 300 MILLIGRAM(S): KIT at 23:30

## 2024-10-22 RX ADMIN — DEXAMETHASONE 1.5 MG 4 MILLIGRAM(S): 1.5 TABLET ORAL at 23:34

## 2024-10-22 RX ADMIN — SODIUM CHLORIDE 70 MILLILITER(S): 9 INJECTION, SOLUTION INTRAMUSCULAR; INTRAVENOUS; SUBCUTANEOUS at 20:48

## 2024-10-22 RX ADMIN — OXYCODONE HYDROCHLORIDE 10 MILLIGRAM(S): 30 TABLET ORAL at 21:46

## 2024-10-22 RX ADMIN — DEXAMETHASONE 1.5 MG 4 MILLIGRAM(S): 1.5 TABLET ORAL at 05:42

## 2024-10-22 RX ADMIN — Medication 1000 MILLIGRAM(S): at 16:00

## 2024-10-22 RX ADMIN — Medication 0.5 MILLIGRAM(S): at 15:36

## 2024-10-22 RX ADMIN — Medication 0.5 MILLIGRAM(S): at 15:06

## 2024-10-22 RX ADMIN — METHOCARBAMOL 750 MILLIGRAM(S): 500 TABLET ORAL at 05:42

## 2024-10-22 RX ADMIN — OXYCODONE HYDROCHLORIDE 10 MILLIGRAM(S): 30 TABLET ORAL at 17:40

## 2024-10-22 RX ADMIN — Medication 0.5 MILLIGRAM(S): at 15:18

## 2024-10-22 RX ADMIN — Medication 2 TABLET(S): at 21:46

## 2024-10-22 RX ADMIN — Medication 975 MILLIGRAM(S): at 23:30

## 2024-10-22 RX ADMIN — Medication 400 MILLIGRAM(S): at 15:36

## 2024-10-22 RX ADMIN — OXYCODONE HYDROCHLORIDE 10 MILLIGRAM(S): 30 TABLET ORAL at 18:40

## 2024-10-22 RX ADMIN — OXYCODONE HYDROCHLORIDE 10 MILLIGRAM(S): 30 TABLET ORAL at 22:46

## 2024-10-22 RX ADMIN — DEXAMETHASONE 1.5 MG 4 MILLIGRAM(S): 1.5 TABLET ORAL at 17:40

## 2024-10-22 NOTE — PROGRESS NOTE ADULT - SUBJECTIVE AND OBJECTIVE BOX
Ivan was awake and alert in bed as I measured , sized and demonstrated donning and adjustment of the [employer] LSO for use OOB.  Brace was labeled and placed on window sill. Printed instructions and contact info were given to pt.  [facility]  [phone]

## 2024-10-22 NOTE — BRIEF OPERATIVE NOTE - OPERATION/FINDINGS
L5-S1 PLIF with plastic surgery closure. Neuromonitoring used throughout procedure. Jones in. No complications. 2 subfascial LORA drains to full suction. 2 subcutaneous drain to full suction.

## 2024-10-22 NOTE — BRIEF OPERATIVE NOTE - NSICDXBRIEFPROCEDURE_GEN_ALL_CORE_FT
PROCEDURES:  Fusion of spine at L5-S1 level using interbody technique 22-Oct-2024 14:39:15  Kristin Mckenzie

## 2024-10-22 NOTE — PROGRESS NOTE ADULT - SUBJECTIVE AND OBJECTIVE BOX
HOSPITALIST PROGRESS NOTE    SINTIA COLLINS  [id number]  29yMale    Patient is a 29y old  Male who presents with a chief complaint of L5-S1 disc herniation (21 Oct 2024 11:09)      SUBJECTIVE:   Chart reviewed since last visit.   Patient seen and examined at bedside for spinal stenosis  Low back pain 4/10 currently with some radiation to right 'glute'  Voiding and had bowel movement.      OBJECTIVE:  Vital Signs Last 24 Hrs  T(C): 36.4 (22 Oct 2024 08:27), Max: 37 (21 Oct 2024 21:20)  T(F): 97.6 (22 Oct 2024 08:27), Max: 98.6 (21 Oct 2024 21:20)  HR: 64 (22 Oct 2024 08:27) (62 - 107)  BP: 137/75 (22 Oct 2024 08:27) (131/63 - 180/78)   RR: 18 (22 Oct 2024 08:27) (17 - 18)  SpO2: 98% (22 Oct 2024 08:27) (94% - 98%)    Parameters below as of 22 Oct 2024 08:27  Patient On (Oxygen Delivery Method): room air    PHYSICAL EXAMINATION  BMI 43.4  General: Obese male lying in bed, comfortable  HEENT:  Anicteric sclera  NECK:  Supple  CVS: regular rate and rhythm S1 S2  RESP:  Clear to auscultation bilaterally  GI:  Soft nondistended nontender BS+  : No suprapubic or CVA tenderness  MSK:  Moves all extremities without any difficulty though raising right lower extremity causes back pain  CNS:  Awake, alert oriented x 3. Asymmetric sensation in lower extremities (Chronic for prior surgery as per patient)  INTEG:  Warm dry skin  PSYCH:  Fair mood      MONITOR:  CAPILLARY BLOOD GLUCOSE      POCT Blood Glucose.: 115 mg/dL (22 Oct 2024 08:19)  POCT Blood Glucose.: 184 mg/dL (21 Oct 2024 21:34)  POCT Blood Glucose.: 128 mg/dL (21 Oct 2024 17:18)  POCT Blood Glucose.: 134 mg/dL (21 Oct 2024 11:26)        I&O's Summary    21 Oct 2024 07:01  -  22 Oct 2024 07:00  --------------------------------------------------------  IN: 830 mL / OUT: 0 mL / NET: 830 mL                            15.1   11.65 )-----------( 268      ( 22 Oct 2024 05:17 )             44.6       10-22    139  |  104  |  17.5  ----------------------------<  132[H]  4.3   |  23.0  |  0.73    Ca    8.7      22 Oct 2024 05:17  Phos  3.4     10-22  Mg     2.1     10-22          Urinalysis Basic - ( 22 Oct 2024 05:17 )    Color: x / Appearance: x / SG: x / pH: x  Gluc: 132 mg/dL / Ketone: x  / Bili: x / Urobili: x   Blood: x / Protein: x / Nitrite: x   Leuk Esterase: x / RBC: x / WBC x   Sq Epi: x / Non Sq Epi: x / Bacteria: x            TTE:    RADIOLOGY        MEDICATIONS  (STANDING):  chlorhexidine 4% Liquid 1 Application(s) Topical once  dexAMETHasone     Tablet 4 milliGRAM(s) Oral every 6 hours  dextrose 5%. 1000 milliLiter(s) (100 mL/Hr) IV Continuous <Continuous>  dextrose 5%. 1000 milliLiter(s) (50 mL/Hr) IV Continuous <Continuous>  dextrose 50% Injectable 12.5 Gram(s) IV Push once  dextrose 50% Injectable 25 Gram(s) IV Push once  dextrose 50% Injectable 25 Gram(s) IV Push once  glucagon  Injectable 1 milliGRAM(s) IntraMuscular once  influenza   Vaccine 0.5 milliLiter(s) IntraMuscular once  insulin lispro (ADMELOG) corrective regimen sliding scale   SubCutaneous three times a day before meals  insulin lispro (ADMELOG) corrective regimen sliding scale   SubCutaneous at bedtime  methocarbamol 750 milliGRAM(s) Oral three times a day  multivitamin 1 Tablet(s) Oral daily  pantoprazole  Injectable 40 milliGRAM(s) IV Push daily  polyethylene glycol 3350 17 Gram(s) Oral daily  povidone iodine 10% Solution 1 Application(s) Topical once  senna 2 Tablet(s) Oral at bedtime  sodium chloride 0.9%. 1000 milliLiter(s) (75 mL/Hr) IV Continuous <Continuous>  tranexamic acid IVPB 1000 milliGRAM(s) IV Intermittent once      MEDICATIONS  (PRN):  acetaminophen     Tablet .. 650 milliGRAM(s) Oral every 6 hours PRN Mild Pain (1 - 3)  acetaminophen   IVPB .. 1000 milliGRAM(s) IV Intermittent every 6 hours PRN Severe Pain (7 - 10)  dextrose Oral Gel 15 Gram(s) Oral once PRN Blood Glucose LESS THAN 70 milliGRAM(s)/deciliter  lidocaine   4% Patch 1 Patch Transdermal daily PRN pain  ondansetron Injectable 4 milliGRAM(s) IV Push every 6 hours PRN Nausea and/or Vomiting     HOSPITALIST PROGRESS NOTE    SINTIA COLLINS  [id number]  29yMale    Patient is a 29y old  Male who presents with a chief complaint of L5-S1 disc herniation (21 Oct 2024 11:09)      SUBJECTIVE:   Chart reviewed since last visit.   Patient seen and examined at bedside for spinal stenosis  Low back pain 4/10 currently with some radiation to right 'glute'  Voiding and had bowel movement.  Awaiting surgery today      OBJECTIVE:  Vital Signs Last 24 Hrs  T(C): 36.4 (22 Oct 2024 08:27), Max: 37 (21 Oct 2024 21:20)  T(F): 97.6 (22 Oct 2024 08:27), Max: 98.6 (21 Oct 2024 21:20)  HR: 64 (22 Oct 2024 08:27) (62 - 107)  BP: 137/75 (22 Oct 2024 08:27) (131/63 - 180/78)   RR: 18 (22 Oct 2024 08:27) (17 - 18)  SpO2: 98% (22 Oct 2024 08:27) (94% - 98%)    Parameters below as of 22 Oct 2024 08:27  Patient On (Oxygen Delivery Method): room air    PHYSICAL EXAMINATION  BMI 43.4  General: Obese male lying in bed, comfortable  HEENT:  Anicteric sclera  NECK:  Supple  CVS: regular rate and rhythm S1 S2  RESP:  Clear to auscultation bilaterally  GI:  Soft nondistended nontender BS+  : No suprapubic or CVA tenderness  MSK:  Moves all extremities without any difficulty though raising right lower extremity causes back pain  CNS:  Awake, alert oriented x 3. Asymmetric sensation in lower extremities (Chronic from prior surgery as per patient)  INTEG:  Warm dry skin  PSYCH:  Fair mood      MONITOR:  CAPILLARY BLOOD GLUCOSE      POCT Blood Glucose.: 115 mg/dL (22 Oct 2024 08:19)  POCT Blood Glucose.: 184 mg/dL (21 Oct 2024 21:34)  POCT Blood Glucose.: 128 mg/dL (21 Oct 2024 17:18)  POCT Blood Glucose.: 134 mg/dL (21 Oct 2024 11:26)        I&O's Summary    21 Oct 2024 07:01  -  22 Oct 2024 07:00  --------------------------------------------------------  IN: 830 mL / OUT: 0 mL / NET: 830 mL                            15.1   11.65 )-----------( 268      ( 22 Oct 2024 05:17 )             44.6       10-22    139  |  104  |  17.5  ----------------------------<  132[H]  4.3   |  23.0  |  0.73    Ca    8.7      22 Oct 2024 05:17  Phos  3.4     10-22  Mg     2.1     10-22          Urinalysis Basic - ( 22 Oct 2024 05:17 )    Color: x / Appearance: x / SG: x / pH: x  Gluc: 132 mg/dL / Ketone: x  / Bili: x / Urobili: x   Blood: x / Protein: x / Nitrite: x   Leuk Esterase: x / RBC: x / WBC x   Sq Epi: x / Non Sq Epi: x / Bacteria: x            TTE:    RADIOLOGY        MEDICATIONS  (STANDING):  chlorhexidine 4% Liquid 1 Application(s) Topical once  dexAMETHasone     Tablet 4 milliGRAM(s) Oral every 6 hours  dextrose 5%. 1000 milliLiter(s) (100 mL/Hr) IV Continuous <Continuous>  dextrose 5%. 1000 milliLiter(s) (50 mL/Hr) IV Continuous <Continuous>  dextrose 50% Injectable 12.5 Gram(s) IV Push once  dextrose 50% Injectable 25 Gram(s) IV Push once  dextrose 50% Injectable 25 Gram(s) IV Push once  glucagon  Injectable 1 milliGRAM(s) IntraMuscular once  influenza   Vaccine 0.5 milliLiter(s) IntraMuscular once  insulin lispro (ADMELOG) corrective regimen sliding scale   SubCutaneous three times a day before meals  insulin lispro (ADMELOG) corrective regimen sliding scale   SubCutaneous at bedtime  methocarbamol 750 milliGRAM(s) Oral three times a day  multivitamin 1 Tablet(s) Oral daily  pantoprazole  Injectable 40 milliGRAM(s) IV Push daily  polyethylene glycol 3350 17 Gram(s) Oral daily  povidone iodine 10% Solution 1 Application(s) Topical once  senna 2 Tablet(s) Oral at bedtime  sodium chloride 0.9%. 1000 milliLiter(s) (75 mL/Hr) IV Continuous <Continuous>  tranexamic acid IVPB 1000 milliGRAM(s) IV Intermittent once      MEDICATIONS  (PRN):  acetaminophen     Tablet .. 650 milliGRAM(s) Oral every 6 hours PRN Mild Pain (1 - 3)  acetaminophen   IVPB .. 1000 milliGRAM(s) IV Intermittent every 6 hours PRN Severe Pain (7 - 10)  dextrose Oral Gel 15 Gram(s) Oral once PRN Blood Glucose LESS THAN 70 milliGRAM(s)/deciliter  lidocaine   4% Patch 1 Patch Transdermal daily PRN pain  ondansetron Injectable 4 milliGRAM(s) IV Push every 6 hours PRN Nausea and/or Vomiting

## 2024-10-22 NOTE — PROGRESS NOTE ADULT - SUBJECTIVE AND OBJECTIVE BOX
POST-OPERATIVE NOTE    Procedure: L5-S1 PLIF    Diagnosis/Indication: L5-S1 disc herniation    Surgeon: Dr. Sykes    INTERVAL HPI/ACUTE EVENTS:  29M with PSH of right L5-S1 hemilaminectomy for disc herniation with Dr. Sykes (10/2020) and repair of left bicep tendon (10/2023) admitted with L5-S1 disc herniation, now s/p L5-S1 PLIF, POD#0. Patient seen lying comfortably in bed. Pt c/o incisional back pain that is improving with pain medication. Pt reports mild L hand tingling, however R leg tingling is improved. Denies n/v, weakness, numbness, headache, or vision changes.     VITALS:  T(C): 36.6 (10-22-24 @ 14:52), Max: 37 (10-21-24 @ 21:20)  HR: 74 (10-22-24 @ 16:00) (62 - 91)  BP: 151/83 (10-22-24 @ 16:00) (131/63 - 162/83)  RR: 13 (10-22-24 @ 16:00) (13 - 18)  SpO2: 100% (10-22-24 @ 16:00) (95% - 100%)  Wt(kg): --    PHYSICAL EXAM:  GENERAL: NAD  DRAINS: 2 subfascial LORA drains to full suction, 2 subcutaneous LORA drains to full suction. Serosanguinous drainage noted.  WOUND: Dressing clean dry intact  TINO COMA SCORE: E-4 V-5 M-6 = 15  MENTAL STATUS: AAO x3; Awake; Opens eyes spontaneously; Appropriately conversant without aphasia; following commands  CRANIAL NERVES: PERRL. EOMI without nystagmus. Facial sensation intact V1-3 distribution b/l. Face symmetric w/ normal eye closure and smile, tongue midline. Hearing grossly intact. Speech clear.   MOTOR: strength 5/5 b/l upper and lower extremities  SENSATION: subjective diminished sensation in L hand, otherwise grossly intact to light touch all extremities  CHEST/LUNG: Nonlabored breathing  SKIN: Warm, dry    LABS:                        15.1   11.65 )-----------( 268      ( 22 Oct 2024 05:17 )             44.6     10-22    139  |  104  |  17.5  ----------------------------<  132[H]  4.3   |  23.0  |  0.73    Ca    8.7      22 Oct 2024 05:17  Phos  3.4     10-22  Mg     2.1     10-22    RADIOLOGY/OTHER:  < from: CT Lumbar Spine No Cont (10.19.24 @ 18:21) >  IMPRESSION:    L5-S1 disc osteophyte complex with severe canal stenosis, worse on the   right side. Suspected impingement of the exiting right L5 nerve root.    < from: MR Lumbar Spine No Cont (10.19.24 @ 15:35) >  IMPRESSION:  L5-S1: Right hemilaminotomy. Disc osteophyte complex with large   central/right paracentral/foraminal disc extrusion, resulting in severe   spinal canal stenosis and severe right lateral recess stenosis with   impingement upon the right descending S1 nerve root. The disc osteophyte   complex also narrows the left lateral recess and contacts the left   descending S1 nerve root. Bilateral facet arthrosis. Moderate right and   mild left neural foraminal stenosis.

## 2024-10-22 NOTE — PROGRESS NOTE ADULT - ASSESSMENT
29M with PSH of right L5-S1 hemilaminectomy for disc herniation with Dr. Sykes (10/2020) and repair of left bicep tendon (10/2023) admitted with L5-S1 disc herniation, now s/p L5-S1 PLIF, POD#0. Patient seen lying comfortably in bed. Pt c/o incisional back pain that is improving with pain medication. Pt reports mild L hand tingling, however R leg tingling is improved.     Plan:  - Q4 neuro checks  - Normotensive  - 2 subfascial LORA drains and 2 subcutaneous LORA drains, all to full suction  - Vanco while the drains are in  - Pain control prn; tylenol, oxy 5/10, dilaudid 0.5 for breakthrough  - Flexeril PRN muscle spasms   - Decadron 4mg q6  - Standing AP/Lateral L-spine xrays  - HOB 30 degrees  - TOV in AM   - LSO brace when OOB, orthotist aware  - LED POD#1 or 2  - PT/OT  - Bowel regimen: senna, miralax, record BMs  - Case management to establish home care for drain management   - DVT ppx: SCDs only, ok for Lovenox POD#1 night   - Discussed with Dr. Sykes

## 2024-10-22 NOTE — PROGRESS NOTE ADULT - ASSESSMENT
28 y/o male with Hx of right L5-S1 hemilaminectomy for disc herniation with Dr. Sykes (10/2020) and repair of left bicep tendon (10/2023), he came to ED after experiencing acute on chronic lower back pain with radiation to b/l gluteal muscles and b/l anterior thigh numbness/tingling x 3 days. Patient states symptoms worsen with ambulation and occur intermittently throughout the day, denies any recent trauma, admitted under Neurosurgery team, medicine consulted for Medical management.      # Lower back pain due to herniated disc  - VS stable  - Pre op abx per primary team   - IVF when NPO  - opiate induced constipation regimen   - encouraging incentive spirometry   -DVT prophylaxis and Pain meds as per primary team   -PT/OT and weight bearing per primary team   Patient denies Hx of exertional dysnea or chest pain, no personal or family hx of easy bleeding, Patient's METS Score is >4 and RCRI is 0 patient labs, EKG reviewed, patient is at low risk for perioperative cardiovascular complication and is optimized from the medicine point of view for planned procedure.     # Leukocytosis  Likely due to steroids  No focus of infection  - Monitor WBC    # Obesity. BMI 43.4  - Consider Nutrition consult and bariatric consult     28 y/o male with Hx of right L5-S1 hemilaminectomy for disc herniation with Dr. Sykes (10/2020) and repair of left bicep tendon (10/2023), he came to Emergency Department after experiencing acute on chronic lower back pain with radiation to b/l gluteal muscles and b/l anterior thigh numbness/tingling x 3 days. Patient states symptoms worsen with ambulation and occur intermittently throughout the day, denies any recent trauma, admitted under Neurosurgery team, medicine consulted for Medical management.      # Lower back pain due to herniated disc  - VS stable  - Pre op abx per primary team   - IVF when NPO  - opiate induced constipation regimen   - encouraging incentive spirometry   -DVT prophylaxis and Pain meds as per primary team   -PT/OT and weight bearing per primary team   Patient denies Hx of exertional dyspnea or chest pain, no personal or family hx of easy bleeding, Patient's METS Score is >4 and RCRI is 0 patient labs, EKG reviewed, patient is at low risk for perioperative cardiovascular complication and is optimized from the medicine point of view for planned procedure.     # Leukocytosis  Likely due to steroids  No focus of infection  - Monitor WBC    # Obesity. BMI 43.4  - Consider Nutrition consult and bariatric consult

## 2024-10-23 LAB
ANION GAP SERPL CALC-SCNC: 10 MMOL/L — SIGNIFICANT CHANGE UP (ref 5–17)
BUN SERPL-MCNC: 20.7 MG/DL — HIGH (ref 8–20)
CALCIUM SERPL-MCNC: 8.5 MG/DL — SIGNIFICANT CHANGE UP (ref 8.4–10.5)
CHLORIDE SERPL-SCNC: 102 MMOL/L — SIGNIFICANT CHANGE UP (ref 96–108)
CO2 SERPL-SCNC: 26 MMOL/L — SIGNIFICANT CHANGE UP (ref 22–29)
CREAT SERPL-MCNC: 0.81 MG/DL — SIGNIFICANT CHANGE UP (ref 0.5–1.3)
EGFR: 122 ML/MIN/1.73M2 — SIGNIFICANT CHANGE UP
GLUCOSE BLDC GLUCOMTR-MCNC: 112 MG/DL — HIGH (ref 70–99)
GLUCOSE BLDC GLUCOMTR-MCNC: 124 MG/DL — HIGH (ref 70–99)
GLUCOSE BLDC GLUCOMTR-MCNC: 137 MG/DL — HIGH (ref 70–99)
GLUCOSE BLDC GLUCOMTR-MCNC: 178 MG/DL — HIGH (ref 70–99)
GLUCOSE SERPL-MCNC: 113 MG/DL — HIGH (ref 70–99)
HCT VFR BLD CALC: 43.3 % — SIGNIFICANT CHANGE UP (ref 39–50)
HGB BLD-MCNC: 14.8 G/DL — SIGNIFICANT CHANGE UP (ref 13–17)
MAGNESIUM SERPL-MCNC: 2.2 MG/DL — SIGNIFICANT CHANGE UP (ref 1.6–2.6)
MCHC RBC-ENTMCNC: 28.8 PG — SIGNIFICANT CHANGE UP (ref 27–34)
MCHC RBC-ENTMCNC: 34.2 GM/DL — SIGNIFICANT CHANGE UP (ref 32–36)
MCV RBC AUTO: 84.4 FL — SIGNIFICANT CHANGE UP (ref 80–100)
PHOSPHATE SERPL-MCNC: 3.3 MG/DL — SIGNIFICANT CHANGE UP (ref 2.4–4.7)
PLATELET # BLD AUTO: 269 K/UL — SIGNIFICANT CHANGE UP (ref 150–400)
POTASSIUM SERPL-MCNC: 4.5 MMOL/L — SIGNIFICANT CHANGE UP (ref 3.5–5.3)
POTASSIUM SERPL-SCNC: 4.5 MMOL/L — SIGNIFICANT CHANGE UP (ref 3.5–5.3)
RBC # BLD: 5.13 M/UL — SIGNIFICANT CHANGE UP (ref 4.2–5.8)
RBC # FLD: 13.2 % — SIGNIFICANT CHANGE UP (ref 10.3–14.5)
SODIUM SERPL-SCNC: 138 MMOL/L — SIGNIFICANT CHANGE UP (ref 135–145)
WBC # BLD: 12.61 K/UL — HIGH (ref 3.8–10.5)
WBC # FLD AUTO: 12.61 K/UL — HIGH (ref 3.8–10.5)

## 2024-10-23 PROCEDURE — 99232 SBSQ HOSP IP/OBS MODERATE 35: CPT

## 2024-10-23 PROCEDURE — 72100 X-RAY EXAM L-S SPINE 2/3 VWS: CPT | Mod: 26

## 2024-10-23 PROCEDURE — 99223 1ST HOSP IP/OBS HIGH 75: CPT

## 2024-10-23 PROCEDURE — 93970 EXTREMITY STUDY: CPT | Mod: 26

## 2024-10-23 RX ORDER — DEXAMETHASONE 1.5 MG 1.5 MG/1
1 TABLET ORAL EVERY 12 HOURS
Refills: 0 | Status: COMPLETED | OUTPATIENT
Start: 2024-10-26 | End: 2024-10-27

## 2024-10-23 RX ORDER — DEXAMETHASONE 1.5 MG 1.5 MG/1
3 TABLET ORAL EVERY 8 HOURS
Refills: 0 | Status: COMPLETED | OUTPATIENT
Start: 2024-10-23 | End: 2024-10-24

## 2024-10-23 RX ORDER — VANCOMYCIN HYDROCHLORIDE 50 MG/ML
1000 KIT ORAL EVERY 8 HOURS
Refills: 0 | Status: DISCONTINUED | OUTPATIENT
Start: 2024-10-23 | End: 2024-10-24

## 2024-10-23 RX ORDER — DEXAMETHASONE 1.5 MG 1.5 MG/1
2 TABLET ORAL EVERY 12 HOURS
Refills: 0 | Status: COMPLETED | OUTPATIENT
Start: 2024-10-25 | End: 2024-10-26

## 2024-10-23 RX ORDER — DEXAMETHASONE 1.5 MG 1.5 MG/1
1 TABLET ORAL DAILY
Refills: 0 | Status: DISCONTINUED | OUTPATIENT
Start: 2024-10-27 | End: 2024-10-27

## 2024-10-23 RX ORDER — DEXAMETHASONE 1.5 MG 1.5 MG/1
TABLET ORAL
Refills: 0 | Status: DISCONTINUED | OUTPATIENT
Start: 2024-10-23 | End: 2024-10-27

## 2024-10-23 RX ORDER — DEXAMETHASONE 1.5 MG 1.5 MG/1
2 TABLET ORAL EVERY 8 HOURS
Refills: 0 | Status: COMPLETED | OUTPATIENT
Start: 2024-10-24 | End: 2024-10-25

## 2024-10-23 RX ADMIN — Medication 975 MILLIGRAM(S): at 18:11

## 2024-10-23 RX ADMIN — OXYCODONE HYDROCHLORIDE 10 MILLIGRAM(S): 30 TABLET ORAL at 18:11

## 2024-10-23 RX ADMIN — Medication 975 MILLIGRAM(S): at 00:30

## 2024-10-23 RX ADMIN — VANCOMYCIN HYDROCHLORIDE 250 MILLIGRAM(S): KIT at 18:11

## 2024-10-23 RX ADMIN — OXYCODONE HYDROCHLORIDE 10 MILLIGRAM(S): 30 TABLET ORAL at 09:39

## 2024-10-23 RX ADMIN — LIDOCAINE HYDROCHLORIDE 1 PATCH: 40 SOLUTION TOPICAL at 11:34

## 2024-10-23 RX ADMIN — Medication 2 TABLET(S): at 21:56

## 2024-10-23 RX ADMIN — VANCOMYCIN HYDROCHLORIDE 250 MILLIGRAM(S): KIT at 11:33

## 2024-10-23 RX ADMIN — OXYCODONE HYDROCHLORIDE 10 MILLIGRAM(S): 30 TABLET ORAL at 08:39

## 2024-10-23 RX ADMIN — Medication 975 MILLIGRAM(S): at 05:11

## 2024-10-23 RX ADMIN — DEXAMETHASONE 1.5 MG 3 MILLIGRAM(S): 1.5 TABLET ORAL at 13:44

## 2024-10-23 RX ADMIN — OXYCODONE HYDROCHLORIDE 10 MILLIGRAM(S): 30 TABLET ORAL at 22:13

## 2024-10-23 RX ADMIN — OXYCODONE HYDROCHLORIDE 10 MILLIGRAM(S): 30 TABLET ORAL at 23:13

## 2024-10-23 RX ADMIN — PANTOPRAZOLE SODIUM 40 MILLIGRAM(S): 40 TABLET, DELAYED RELEASE ORAL at 05:11

## 2024-10-23 RX ADMIN — SODIUM CHLORIDE 70 MILLILITER(S): 9 INJECTION, SOLUTION INTRAMUSCULAR; INTRAVENOUS; SUBCUTANEOUS at 21:57

## 2024-10-23 RX ADMIN — DEXAMETHASONE 1.5 MG 4 MILLIGRAM(S): 1.5 TABLET ORAL at 05:11

## 2024-10-23 RX ADMIN — Medication 40 MILLIGRAM(S): at 21:56

## 2024-10-23 RX ADMIN — Medication 975 MILLIGRAM(S): at 12:34

## 2024-10-23 RX ADMIN — Medication 1 TABLET(S): at 11:34

## 2024-10-23 RX ADMIN — DEXAMETHASONE 1.5 MG 3 MILLIGRAM(S): 1.5 TABLET ORAL at 21:56

## 2024-10-23 RX ADMIN — Medication 975 MILLIGRAM(S): at 23:24

## 2024-10-23 RX ADMIN — LIDOCAINE HYDROCHLORIDE 1 PATCH: 40 SOLUTION TOPICAL at 23:00

## 2024-10-23 RX ADMIN — POLYETHYLENE GLYCOL 3350 17 GRAM(S): 17 POWDER, FOR SOLUTION ORAL at 21:56

## 2024-10-23 RX ADMIN — Medication 975 MILLIGRAM(S): at 11:34

## 2024-10-23 NOTE — OCCUPATIONAL THERAPY INITIAL EVALUATION ADULT - ADDITIONAL COMMENTS
Pt has a shower with curtains and no grab bars. Pt has access to commode. Pt owns no DME of his own, is independent with ADLs and mobility PTA. Pt is R handed and drives.

## 2024-10-23 NOTE — OCCUPATIONAL THERAPY INITIAL EVALUATION ADULT - REHAB POTENTIAL, OT EVAL
pt demonstrates independence for ambulation and transfers and ADLs except for min A for bathing/dressing which family can assist with needs/none

## 2024-10-23 NOTE — PHYSICAL THERAPY INITIAL EVALUATION ADULT - GROSSLY INTACT, SENSORY
Problem: Dysphagia (Adult) Goal: *Acute Goals and Plan of Care (Insert Text) Description: Patient will: 1. Tolerate diet upgrade without overt s/sx of aspiration under SLP supervision. 2. Utilize compensatory swallow strategies of small bite/sip, alternate liquid/solid with min cues in 4/5 trials. 4. Perform oral-motor/laryngeal elevation exercises 10 reps/each to increase oropharyngeal swallow function with min cues. 5. Complete an objective swallow study (i.e., MBSS) to assess swallow integrity, r/o aspiration, and determine of safest LRD, min A. 
 
 
Rec:  
NPO Aspiration precautions HOB >45 during po intake, remain >30 for 30-45 minutes after po Small bites/sips; alternate liquid/solid, slow feeding rate Oral care TID Meds via IV Outcome: Progressing Towards Goal 
  
SPEECH LANGUAGE PATHOLOGY BEDSIDE SWALLOW EVALUATION Patient: Ellie Montes (80 y.o. male) Date: 11/3/2020 Primary Diagnosis: Hypoxia [R09.02] Pneumonia [J18.9] Precautions: aspiration PLOF: per H&P 
 
ASSESSMENT : 
Based on the objective data described below, the patient presents with mod-severe pharyngeal dysphagia. Pt seen for swallow eval. Pt alert, oriented to self only (which is baseline), accepting of eval. RN reports pt came to hospital with AMS, current orientation x1 is baseline and pt was coughing with thin liquids. Oral motor structures, strength, and ROM WFL; grossly intact for mastication and deglutition. Pt given PO trials of thin, nectar thick, and honey thick liquid via cup/straw and tandem drinking, and puree texture. Pt with delayed weak cough following thin liquid, drop in SpO2 from 95% to 92% with nectar and honey thick liquid. Puree taken without s/sx aspiration. Delayed swallow initiation noted, decreased laryngeal elevation to palpation. Pt is at risk for aspiration d/t incoordination of respiratory-swallow sequence. Recommend continue NPO, consideration alternate means nutrition/hydration, meds via IV. Further recommend MBSS be completed next business day to further assess swallow function. Pt educated on and verbalized understanding of findings, recs, and POC - further education and reinforcement needed; d/w RN, Kelly Kathleen. SLP will FU per POC. Patient will benefit from skilled intervention to address the above impairments. Patient's rehabilitation potential is considered to be Fair Factors which may influence rehabilitation potential include:  
[]            None noted [x]            Mental ability/status []            Medical condition []            Home/family situation and support systems [x]            Safety awareness 
[]            Pain tolerance/management []            Other: PLAN : 
Recommendations and Planned Interventions: 
See above. Frequency/Duration: Patient will be followed by speech-language pathology 1-2 times per day/3-5 days per week to address goals. Discharge Recommendations: To Be Determined SUBJECTIVE:  
Patient stated Prudence Lisa gave my granddaughter a straw to drink once and it helped her. OBJECTIVE:  
 
Past Medical History:  
Diagnosis Date Anticoagulant long-term use Arthritis Atrial fibrillation ([address] Atrial fibrillation ([address] Dementia ([address] Depression Depression Diabetes ([address] 1980s Fracture, femur ([address] GERD (gastroesophageal reflux disease) Heart failure ([address] Hypercholesterolemia Hyperlipemia Hypertension 2000 Hypoxia Ill-defined condition 2015  
 has passed out-has been tested cannot determine cause Osteoarthritis   
 of knees Pneumonia Primary osteoarthritis of right knee 6/28/2018 Ulcer   
 at the anastomotic bite of gastric bypass Varicose veins Weakness Wide-complex tachycardia ([address] 5/2/2015 Past Surgical History:  
Procedure Laterality Date [facility] umb hernia Rupture BIOPSY LIVER  10/05/04 EGD  12/29/09  
 with biopsy ENDOSCOPY, COLON, DIAGNOSTIC    
 GASTROSTOMY TUBE  10/05/04 HX CATARACT REMOVAL    
 bilateral  
 HX CHOLECYSTECTOMY  10/05/04 HX GI  10/05/04  
 vertical banded gastroplasty/gastric bypass HX KNEE REPLACEMENT    
 HX MOHS PROCEDURES  1991/1995  
 bilateral  
 HX PACEMAKER  2015 [employer]-Reveal Ling Cardiac Monitor Home Situation:  
  
 
Diet prior to admission: unknown Current Diet:  NPO Cognitive and Communication Status: 
Neurologic State: Alert Orientation Level: Oriented to person(pt at baseline) Cognition: Follows commands Perception: Appears intact Perseveration: No perseveration noted Safety/Judgement: Fall prevention Oral Assessment: 
Oral Assessment Labial: No impairment Dentition: Natural;Intact Oral Hygiene: Samira Mejia Lingual: No impairment Velum: No impairment Mandible: No impairment P.O. Trials: 
Patient Position: HOB <60* Vocal quality prior to P.O.: Low volume Consistency Presented: Thin liquid; Nectar thick liquid;Honey thick liquid;Puree How Presented: SLP-fed/presented;Cup/sip;Straw;Successive swallows;Spoon Bolus Acceptance: No impairment Bolus Formation/Control: No impairment Propulsion: No impairment Oral Residue: None Initiation of Swallow: Delayed (# of seconds) Laryngeal Elevation: Decreased Aspiration Signs/Symptoms: Delayed cough/throat clear;Decrease in O2 saturations Pharyngeal Phase Characteristics: Altered vocal quality;Multiple swallows Effective Modifications: None Cues for Modifications: Moderate Oral Phase Severity: No impairment Pharyngeal Phase Severity : Moderate-severe PAIN: 
Pain level pre-treatment: 0/10 Pain level post-treatment: 0/10 Pain Intervention(s): NA  
Response to intervention: NA After treatment:  
[]            Patient left in no apparent distress sitting up in chair [x]            Patient left in no apparent distress in bed 
[]            Call bell left within reach [x]            Nursing notified []            Family present 
[]            Caregiver present 
[]            Bed alarm activated COMMUNICATION/EDUCATION:  
[x]            Aspiration precautions; swallow safety; compensatory techniques. [x]            Patient/family have participated as able in goal setting and plan of care. [x]            Patient/family agree to work toward stated goals and plan of care. []            Patient understands intent and goals of therapy; neutral about participation. []            Patient unable to participate in goal setting/plan of care; educ ongoing with interdisciplinary staff 
[]         Posted safety precautions in patient's room. Thank you for this referral. 
 
Vaibhav Mandel M.S., CCC-SLP Speech-Language Pathologist 
 
Time Calculation: 16 mins Grossly Intact

## 2024-10-23 NOTE — OCCUPATIONAL THERAPY INITIAL EVALUATION ADULT - LIVES WITH, PROFILE
Pt lives in a house with his mom, brother and sister, someone is always available to assist. No steps to negotiate/other relative/parents

## 2024-10-23 NOTE — PROGRESS NOTE ADULT - SUBJECTIVE AND OBJECTIVE BOX
HOSPITALIST PROGRESS NOTE    SINTIA COLLINS  [id number]  29yMale    Patient is a 29y old  Male who presents with a chief complaint of L5-S1 disc herniation (23 Oct 2024 09:51)      SUBJECTIVE:   Chart reviewed since last visit.   Patient seen and examined at bedside for herniated disc  Pain in arm - attributes to position for anesthesia.  Pain in back and buttocks resolved      OBJECTIVE:  Vital Signs Last 24 Hrs  T(C): 37.2 (23 Oct 2024 11:16), Max: 37.2 (23 Oct 2024 11:16)  T(F): 98.9 (23 Oct 2024 11:16), Max: 98.9 (23 Oct 2024 11:16)  HR: 77 (23 Oct 2024 11:16) (70 - 91)  BP: 134/86 (23 Oct 2024 11:16) (134/86 - 153/90)  BP(mean): 100 (22 Oct 2024 16:30) (95 - 112)  RR: 18 (23 Oct 2024 11:16) (13 - 18)  SpO2: 95% (23 Oct 2024 11:16) (95% - 100%)    Parameters below as of 23 Oct 2024 11:16  Patient On (Oxygen Delivery Method): room air    PHYSICAL EXAMINATION  BMI 43.4  General: Obese male sitting up in chair,  comfortable  HEENT:  Anicteric sclera  NECK:  Supple  CVS: regular rate and rhythm S1 S2  RESP:  Clear to auscultation bilaterally  GI:  Soft nondistended nontender BS+  : No suprapubic or CVA tenderness  MSK:  Moves all extremities without any difficulty  Lumbar dressing and drain in place  CNS:  Awake, alert oriented x 3. Asymmetric sensation in lower extremities (Chronic from prior surgery as per patient)  INTEG:  Warm dry skin  PSYCH:  Fair mood      MONITOR:  CAPILLARY BLOOD GLUCOSE      POCT Blood Glucose.: 137 mg/dL (23 Oct 2024 11:36)  POCT Blood Glucose.: 124 mg/dL (23 Oct 2024 08:33)  POCT Blood Glucose.: 129 mg/dL (22 Oct 2024 22:08)  POCT Blood Glucose.: 126 mg/dL (22 Oct 2024 15:02)        I&O's Summary    22 Oct 2024 07:01  -  23 Oct 2024 07:00  --------------------------------------------------------  IN: 1070 mL / OUT: 2303 mL / NET: -1233 mL                            14.8   12.61 )-----------( 269      ( 23 Oct 2024 07:15 )             43.3       10-23    138  |  102  |  20.7[H]  ----------------------------<  113[H]  4.5   |  26.0  |  0.81    Ca    8.5      23 Oct 2024 07:15  Phos  3.3     10-23  Mg     2.2     10-23          Urinalysis Basic - ( 23 Oct 2024 07:15 )    Color: x / Appearance: x / SG: x / pH: x  Gluc: 113 mg/dL / Ketone: x  / Bili: x / Urobili: x   Blood: x / Protein: x / Nitrite: x   Leuk Esterase: x / RBC: x / WBC x   Sq Epi: x / Non Sq Epi: x / Bacteria: x            TTE:    RADIOLOGY        MEDICATIONS  (STANDING):  acetaminophen     Tablet .. 975 milliGRAM(s) Oral every 6 hours  dexAMETHasone     Tablet   Oral   dexAMETHasone     Tablet 3 milliGRAM(s) Oral every 8 hours  dextrose 5%. 1000 milliLiter(s) (100 mL/Hr) IV Continuous <Continuous>  dextrose 5%. 1000 milliLiter(s) (50 mL/Hr) IV Continuous <Continuous>  dextrose 50% Injectable 25 Gram(s) IV Push once  dextrose 50% Injectable 12.5 Gram(s) IV Push once  dextrose 50% Injectable 25 Gram(s) IV Push once  enoxaparin Injectable 40 milliGRAM(s) SubCutaneous every 12 hours  glucagon  Injectable 1 milliGRAM(s) IntraMuscular once  influenza   Vaccine 0.5 milliLiter(s) IntraMuscular once  insulin lispro (ADMELOG) corrective regimen sliding scale   SubCutaneous three times a day before meals  lidocaine   4% Patch 1 Patch Transdermal every 24 hours  multivitamin 1 Tablet(s) Oral daily  pantoprazole    Tablet 40 milliGRAM(s) Oral before breakfast  polyethylene glycol 3350 17 Gram(s) Oral at bedtime  senna 2 Tablet(s) Oral at bedtime  sodium chloride 0.9%. 1000 milliLiter(s) (70 mL/Hr) IV Continuous <Continuous>  vancomycin  IVPB 1000 milliGRAM(s) IV Intermittent every 8 hours      MEDICATIONS  (PRN):  cyclobenzaprine 10 milliGRAM(s) Oral every 8 hours PRN Muscle Spasm  dextrose Oral Gel 15 Gram(s) Oral once PRN Blood Glucose LESS THAN 70 milliGRAM(s)/deciliter  hydrALAZINE Injectable 10 milliGRAM(s) IV Push every 2 hours PRN SBP>160  HYDROmorphone  Injectable 0.5 milliGRAM(s) IV Push every 4 hours PRN Severe Pain (7 - 10)  labetalol Injectable 10 milliGRAM(s) IV Push every 2 hours PRN Systolic blood pressure >160  oxyCODONE    IR 10 milliGRAM(s) Oral every 4 hours PRN Severe Pain (7 - 10)  oxyCODONE    IR 5 milliGRAM(s) Oral every 4 hours PRN Moderate Pain (4 - 6)

## 2024-10-23 NOTE — OCCUPATIONAL THERAPY INITIAL EVALUATION ADULT - GENERAL OBSERVATIONS, REHAB EVAL
Left pt as received OOB in chair, NAD, +IV lock, +Tele, +JPx4, LSO on RA A&Ox4. Pre/post pain level 6/10, lower back, incision site; RN aware. Pt agreeable to OT evaluation

## 2024-10-23 NOTE — PROGRESS NOTE ADULT - SUBJECTIVE AND OBJECTIVE BOX
pod #1  avss  pain issues being addressed  tolerating diet    fabiola output:  18  170  25  40    iv abx while drains remain  dvt ppx as per protocol  gi ppx  spirometry  activity as per neurosurgery  will follow    A Collins HUNTER

## 2024-10-23 NOTE — PROGRESS NOTE ADULT - ASSESSMENT
29M s/p L5-S1 PLIF POD#1 seen lying comfortably in bed. Tolerating diet. Passing gas. Jones removed earlier this morning, will follow up void. Patient states incisonal back pain is 5/10, controlled with pain meds. Patient reports b/l hand numbness, L>R since surgery that is improving without radiation. Reports stable RLE numbness (posterior thigh down to R foot). Denies any other complaints.    Plan:  - Q4 neuro checks  - Normotensive  - 2 subfascial LORA drains and 2 subcutaneous LORA drains to full suction, monitor output  - Vanco while the drains are in  - Pain control prn; tylenol, oxy 5/10, dilaudid 0.5 for breakthrough  - Flexeril PRN muscle spasms   - Decadron 5 day taper to off  - Standing AP/Lateral L-spine xrays  - Jones removed this AM, will follow up void  - LSO brace when OOB  - LED POD#1 or 2  - PT/OT  - Bowel regimen: senna, miralax, record BMs  - Case management to establish home care for drain management   - DVT ppx: SCDs only, Lovenox POD#1 tonight  - Discussed with Dr. Sykes

## 2024-10-23 NOTE — OCCUPATIONAL THERAPY INITIAL EVALUATION ADULT - PHYSICAL ASSIST/NONPHYSICAL ASSIST:DRESS LOWER BODY, OT EVAL
pt deferred LB device training states family can assist with needs. Pt educated on compensatory dressing techniques within spinal precautions

## 2024-10-23 NOTE — PROGRESS NOTE ADULT - SUBJECTIVE AND OBJECTIVE BOX
HPI:  29-year-old male with PSH of right L5-S1 hemilaminectomy for disc herniation with Dr. Sykes (10/2020) and repair of left bicep tendon (10/2023) presents to ED today after experiencing acute on chronic lower back pain with radiation to b/l gluteal muscles and b/l anterior thigh numbness/tingling x 3 days. Patient states symptoms worsen with ambulation and occur intermittently throughout the day, at the worst pain is 7/10 in the back. Patient states he has chronic numbness in right anteriolateral thigh and lateral aspect of right foot. Denies any recent trauma. States that pain in back is described as a tightening pain and was relieved after taking a muscle relaxer yesterday. Denies any epidural steroid injections. Denies any AC/AP use.  (19 Oct 2024 19:06)    INTERVAL HPI/OVERNIGHT EVENTS:  29M s/p L5-S1 PLIF POD#1 seen lying comfortably in bed. Tolerating diet. Passing gas. Jones removed earlier this morning, will follow up void. Patient states incisonal back pain is 5/10, controlled with pain meds. Patient reports b/l hand numbness, L>R since surgery that is improving without radiation. Reports stable RLE numbness (posterior thigh down to R foot). Denies any other complaints.    Vital Signs Last 24 Hrs  T(C): 36.8 (23 Oct 2024 05:00), Max: 37.1 (22 Oct 2024 16:30)  T(F): 98.3 (23 Oct 2024 05:00), Max: 98.7 (22 Oct 2024 16:30)  HR: 72 (23 Oct 2024 05:00) (70 - 91)  BP: 145/88 (23 Oct 2024 05:00) (137/80 - 153/90)  BP(mean): 100 (22 Oct 2024 16:30) (95 - 112)  RR: 18 (23 Oct 2024 05:00) (13 - 18)  SpO2: 96% (23 Oct 2024 05:00) (95% - 100%)    Parameters below as of 23 Oct 2024 05:00  Patient On (Oxygen Delivery Method): room air    PHYSICAL EXAM:  GENERAL: NAD, well-groomed  HEAD: Atraumatic, normocephalic  DRAINS: 2 subfascial LORA drains to full suction, 2 subcutaneous LORA drains to full suction. Serosanguinous drainage noted.  WOUND: Dressing clean dry intact  TINO COMA SCORE: E-4 V-5 M-6 = 15  MENTAL STATUS: AAO x3; Awake; Opens eyes spontaneously; Appropriately conversant without aphasia; following simple commands  CRANIAL NERVES: PERRL. EOMI without nystagmus. Facial sensation intact V1-3 distribution b/l. Face symmetric w/ normal eye closure and smile, tongue midline. Hearing grossly intact. Speech clear.   MOTOR: strength 5/5 b/l upper and lower extremities  SENSATION: subjective diminished sensation in L hand, otherwise grossly intact to light touch all extremities  CHEST/LUNG: Nonlabored breathing  SKIN: Warm, dry    LABS:                        14.8   12.61 )-----------( 269      ( 23 Oct 2024 07:15 )             43.3     10-23    138  |  102  |  20.7[H]  ----------------------------<  113[H]  4.5   |  26.0  |  0.81    Ca    8.5      23 Oct 2024 07:15  Phos  3.3     10-23  Mg     2.2     10-23    Urinalysis Basic - ( 23 Oct 2024 07:15 )    Color: x / Appearance: x / SG: x / pH: x  Gluc: 113 mg/dL / Ketone: x  / Bili: x / Urobili: x   Blood: x / Protein: x / Nitrite: x   Leuk Esterase: x / RBC: x / WBC x   Sq Epi: x / Non Sq Epi: x / Bacteria: x    10-22 @ 07:01  -  10-23 @ 07:00  --------------------------------------------------------  IN: 1070 mL / OUT: 2303 mL / NET: -1233 mL    RADIOLOGY & ADDITIONAL TESTS:  < from: CT Lumbar Spine No Cont (10.19.24 @ 18:21) >  IMPRESSION:    L5-S1 disc osteophyte complex with severe canal stenosis, worse on the   right side. Suspected impingement of the exiting right L5 nerve root.    < from: MR Lumbar Spine No Cont (10.19.24 @ 15:35) >  IMPRESSION:  L5-S1: Right hemilaminotomy. Disc osteophyte complex with large   central/right paracentral/foraminal disc extrusion, resulting in severe   spinal canal stenosis and severe right lateral recess stenosis with   impingement upon the right descending S1 nerve root. The disc osteophyte   complex also narrows the left lateral recess and contacts the left   descending S1 nerve root. Bilateral facet arthrosis. Moderate right and   mild left neural foraminal stenosis.

## 2024-10-23 NOTE — PROGRESS NOTE ADULT - ASSESSMENT
30 y/o male with Hx of right L5-S1 hemilaminectomy for disc herniation with Dr. Sykes (10/2020) and repair of left bicep tendon (10/2023), he came to Emergency Department after experiencing acute on chronic lower back pain with radiation to b/l gluteal muscles and b/l anterior thigh numbness/tingling x 3 days. Patient states symptoms worsen with ambulation and occur intermittently throughout the day, denies any recent trauma, admitted under Neurosurgery team, medicine consulted for Medical management.      # Lower back pain due to herniated disc  - VS stable  - Pre op abx per primary team   - IVF when NPO  - opiate induced constipation regimen   - encouraging incentive spirometry   -DVT prophylaxis and Pain meds as per primary team   -PT/OT and weight bearing per primary team   Patient denies Hx of exertional dyspnea or chest pain, no personal or family hx of easy bleeding, Patient's METS Score is >4 and RCRI is 0 patient labs, EKG reviewed, patient is at low risk for perioperative cardiovascular complication and is optimized from the medicine point of view for planned procedure.     # Leukocytosis  Likely due to steroids  No focus of infection  - Monitor for fever and trend WBC    # Obesity. BMI 43.4  - Consider Nutrition consult and bariatric consult

## 2024-10-23 NOTE — CONSULT NOTE ADULT - SUBJECTIVE AND OBJECTIVE BOX
29yM was admitted on 10-19 for progressive right > left radiculopathy related to L5 S1 disc herniation. Patient is s/p L5-S1 PLIF by Dr. Sykes.    Imaging Reviewed Today:  CT L SPINE 10/19 - L5-S1 disc osteophyte complex with severe canal stenosis, worse on the right side. Suspected impingement of the exiting right L5 nerve root.    MRI L SPINE 10/19 - L5-S1: Right hemilaminotomy. Disc osteophyte complex with large central/right paracentral/foraminal disc extrusion, resulting in severe spinal canal stenosis and severe right lateral recess stenosis with impingement upon the right descending S1 nerve root. The disc osteophyte complex also narrows the left lateral recess and contacts the left descending S1 nerve root. Bilateral facet arthrosis. Moderate right and mild left neural foraminal stenosis.    ----------------------------  Patient reports he had a difficult night of pain.  Feels relief in the right foot.      VITALS  T(C): 36.8 (10-23-24 @ 05:00), Max: 37.1 (10-22-24 @ 16:30)  HR: 72 (10-23-24 @ 05:00) (70 - 91)  BP: 145/88 (10-23-24 @ 05:00) (137/80 - 153/90)  RR: 18 (10-23-24 @ 05:00) (13 - 18)  SpO2: 96% (10-23-24 @ 05:00) (95% - 100%)  Wt(kg): --    PAST MEDICAL & SURGICAL HISTORY  Lumbar herniated disc    Leukocytosis    Severe obesity (BMI >= 40)    H/O laminectomy    Biceps tendon rupture         RECENT LABS REVIEWED    CBC Full  -  ( 23 Oct 2024 07:15 )  WBC Count : 12.61 K/uL  RBC Count : 5.13 M/uL  Hemoglobin : 14.8 g/dL  Hematocrit : 43.3 %  Platelet Count - Automated : 269 K/uL  Mean Cell Volume : 84.4 fl  Mean Cell Hemoglobin : 28.8 pg  Mean Cell Hemoglobin Concentration : 34.2 gm/dL  Auto Neutrophil # : x  Auto Lymphocyte # : x  Auto Monocyte # : x  Auto Eosinophil # : x  Auto Basophil # : x  Auto Neutrophil % : x  Auto Lymphocyte % : x  Auto Monocyte % : x  Auto Eosinophil % : x  Auto Basophil % : x    10-23    138  |  102  |  20.7[H]  ----------------------------<  113[H]  4.5   |  26.0  |  0.81    Ca    8.5      23 Oct 2024 07:15  Phos  3.3     10-23  Mg     2.2     10-23      Urinalysis Basic - ( 23 Oct 2024 07:15 )    Color: x / Appearance: x / SG: x / pH: x  Gluc: 113 mg/dL / Ketone: x  / Bili: x / Urobili: x   Blood: x / Protein: x / Nitrite: x   Leuk Esterase: x / RBC: x / WBC x   Sq Epi: x / Non Sq Epi: x / Bacteria: x        ALLERGIES  Omnicef (Rash)      MEDICATIONS   acetaminophen     Tablet .. 975 milliGRAM(s) Oral every 6 hours  cyclobenzaprine 10 milliGRAM(s) Oral every 8 hours PRN  dexAMETHasone     Tablet   Oral   dextrose 5%. 1000 milliLiter(s) IV Continuous <Continuous>  dextrose 5%. 1000 milliLiter(s) IV Continuous <Continuous>  dextrose 50% Injectable 25 Gram(s) IV Push once  dextrose 50% Injectable 12.5 Gram(s) IV Push once  dextrose 50% Injectable 25 Gram(s) IV Push once  dextrose Oral Gel 15 Gram(s) Oral once PRN  enoxaparin Injectable 40 milliGRAM(s) SubCutaneous every 12 hours  glucagon  Injectable 1 milliGRAM(s) IntraMuscular once  hydrALAZINE Injectable 10 milliGRAM(s) IV Push every 2 hours PRN  HYDROmorphone  Injectable 0.5 milliGRAM(s) IV Push every 4 hours PRN  influenza   Vaccine 0.5 milliLiter(s) IntraMuscular once  insulin lispro (ADMELOG) corrective regimen sliding scale   SubCutaneous three times a day before meals  labetalol Injectable 10 milliGRAM(s) IV Push every 2 hours PRN  lidocaine   4% Patch 1 Patch Transdermal every 24 hours  multivitamin 1 Tablet(s) Oral daily  oxyCODONE    IR 5 milliGRAM(s) Oral every 4 hours PRN  oxyCODONE    IR 10 milliGRAM(s) Oral every 4 hours PRN  pantoprazole    Tablet 40 milliGRAM(s) Oral before breakfast  polyethylene glycol 3350 17 Gram(s) Oral at bedtime  senna 2 Tablet(s) Oral at bedtime  sodium chloride 0.9%. 1000 milliLiter(s) IV Continuous <Continuous>  vancomycin  IVPB 1000 milliGRAM(s) IV Intermittent every 8 hours      ----------------------------------------------------------------------------------------  FUNCTIONAL HISTORY  Lives with family, 1 IZZY via back  Independent    FUNCTIONAL STATUS/PROGRESS  Pending post-op evals    ----------------------------------------------------------------------------------------  PHYSICAL EXAM  Constitutional - NAD, Comfortable  HEENT - NCAT, EOMI  Neck - Supple, No limited ROM  Chest - Breathing comfortably, No wheezing  Cardiovascular - S1S2   Abdomen - Soft   Extremities - No C/C/E, No calf tenderness   Neurologic Exam -                    Cognitive - AAO to self, place, date, year, situation     Motor -                       LEFT    LE - L2 3/5, L3 4/5, L4 5/5, L5 5/5, S1 5/5                    RIGHT LE - L2 5/5, L3 5/5, L4 5/5, L5 5/5, S1 5/5      Sensory - Decreased to the right medial aspect of foot/toes  Psychiatric - Mood stable, Affect WNL  ----------------------------------------------------------------------------------------  ASSESSMENT/PLAN  29yMale with functional deficits after progressive lumbar disc herniation/radiculopathy  L5-S1 disc herniation, now s/p L5-S1 PLIF - Decadron  Pain - Tylenol, Flexeril, Dilaudid, Lidoderm  DVT PPX - SCDs, Lovenox  Rehab/Impaired mobility and function - Patient continues to require hospitalization for the above diagnoses and ongoing active management of comorbid complications that are substantially posing a threat to bodily function, functional ability and quality of life.     RECOMMEND - OOB daily, Turn Q2 when needed, HOB >30 degrees    Pending LORA removal, PT and OT evals.      When medically optimized, based on the patient's diagnosis, current functional status and potential for progress, expect patient to achieve functional goals for DC HOME.     Will continue to follow. Rehab recommendations are dependent on how functional progress changes as well as how patient continues to participate and tolerate therapeutic interventions, WHICH MAY CHANGE UPON FOLLOW UP EXAMINATIONS. Recommend ongoing mobilization by staff to maintain cardiopulmonary function and prevention of secondary complications related to debility/immobility. Discussed the specific rehabilitation management and recommendations documented above with rehab clinical care team/rehab liaison.      Total Time Spent on Encounter (reviewing clinical notes, labs, radiology and medications, reviewing patient history, physical exam, assessment and discussing rehabilitation options - with consideration of prior level of function, expected level of recovery and return to community living) - 75 minutes

## 2024-10-24 LAB
ANION GAP SERPL CALC-SCNC: 12 MMOL/L — SIGNIFICANT CHANGE UP (ref 5–17)
BUN SERPL-MCNC: 22.9 MG/DL — HIGH (ref 8–20)
CALCIUM SERPL-MCNC: 8.5 MG/DL — SIGNIFICANT CHANGE UP (ref 8.4–10.5)
CHLORIDE SERPL-SCNC: 100 MMOL/L — SIGNIFICANT CHANGE UP (ref 96–108)
CO2 SERPL-SCNC: 24 MMOL/L — SIGNIFICANT CHANGE UP (ref 22–29)
CREAT SERPL-MCNC: 0.83 MG/DL — SIGNIFICANT CHANGE UP (ref 0.5–1.3)
EGFR: 122 ML/MIN/1.73M2 — SIGNIFICANT CHANGE UP
GLUCOSE BLDC GLUCOMTR-MCNC: 116 MG/DL — HIGH (ref 70–99)
GLUCOSE BLDC GLUCOMTR-MCNC: 118 MG/DL — HIGH (ref 70–99)
GLUCOSE BLDC GLUCOMTR-MCNC: 118 MG/DL — HIGH (ref 70–99)
GLUCOSE BLDC GLUCOMTR-MCNC: 134 MG/DL — HIGH (ref 70–99)
GLUCOSE SERPL-MCNC: 115 MG/DL — HIGH (ref 70–99)
HCT VFR BLD CALC: 44.7 % — SIGNIFICANT CHANGE UP (ref 39–50)
HGB BLD-MCNC: 14.8 G/DL — SIGNIFICANT CHANGE UP (ref 13–17)
MAGNESIUM SERPL-MCNC: 2.3 MG/DL — SIGNIFICANT CHANGE UP (ref 1.8–2.6)
MCHC RBC-ENTMCNC: 28.7 PG — SIGNIFICANT CHANGE UP (ref 27–34)
MCHC RBC-ENTMCNC: 33.1 GM/DL — SIGNIFICANT CHANGE UP (ref 32–36)
MCV RBC AUTO: 86.8 FL — SIGNIFICANT CHANGE UP (ref 80–100)
PHOSPHATE SERPL-MCNC: 3.2 MG/DL — SIGNIFICANT CHANGE UP (ref 2.4–4.7)
PLATELET # BLD AUTO: 270 K/UL — SIGNIFICANT CHANGE UP (ref 150–400)
POTASSIUM SERPL-MCNC: 4.5 MMOL/L — SIGNIFICANT CHANGE UP (ref 3.5–5.3)
POTASSIUM SERPL-SCNC: 4.5 MMOL/L — SIGNIFICANT CHANGE UP (ref 3.5–5.3)
RBC # BLD: 5.15 M/UL — SIGNIFICANT CHANGE UP (ref 4.2–5.8)
RBC # FLD: 13.3 % — SIGNIFICANT CHANGE UP (ref 10.3–14.5)
SODIUM SERPL-SCNC: 136 MMOL/L — SIGNIFICANT CHANGE UP (ref 135–145)
VANCOMYCIN FLD-MCNC: 9.1 UG/ML — SIGNIFICANT CHANGE UP
WBC # BLD: 10.63 K/UL — HIGH (ref 3.8–10.5)
WBC # FLD AUTO: 10.63 K/UL — HIGH (ref 3.8–10.5)

## 2024-10-24 PROCEDURE — 99232 SBSQ HOSP IP/OBS MODERATE 35: CPT

## 2024-10-24 RX ORDER — VANCOMYCIN HYDROCHLORIDE 50 MG/ML
1250 KIT ORAL EVERY 8 HOURS
Refills: 0 | Status: DISCONTINUED | OUTPATIENT
Start: 2024-10-24 | End: 2024-10-27

## 2024-10-24 RX ADMIN — Medication 975 MILLIGRAM(S): at 13:15

## 2024-10-24 RX ADMIN — VANCOMYCIN HYDROCHLORIDE 166.67 MILLIGRAM(S): KIT at 18:51

## 2024-10-24 RX ADMIN — OXYCODONE HYDROCHLORIDE 5 MILLIGRAM(S): 30 TABLET ORAL at 09:56

## 2024-10-24 RX ADMIN — Medication 975 MILLIGRAM(S): at 00:24

## 2024-10-24 RX ADMIN — Medication 975 MILLIGRAM(S): at 14:15

## 2024-10-24 RX ADMIN — Medication 2 TABLET(S): at 21:28

## 2024-10-24 RX ADMIN — OXYCODONE HYDROCHLORIDE 5 MILLIGRAM(S): 30 TABLET ORAL at 10:56

## 2024-10-24 RX ADMIN — LIDOCAINE HYDROCHLORIDE 1 PATCH: 40 SOLUTION TOPICAL at 00:32

## 2024-10-24 RX ADMIN — Medication 1 TABLET(S): at 13:16

## 2024-10-24 RX ADMIN — VANCOMYCIN HYDROCHLORIDE 250 MILLIGRAM(S): KIT at 09:58

## 2024-10-24 RX ADMIN — Medication 975 MILLIGRAM(S): at 18:51

## 2024-10-24 RX ADMIN — DEXAMETHASONE 1.5 MG 2 MILLIGRAM(S): 1.5 TABLET ORAL at 13:15

## 2024-10-24 RX ADMIN — PANTOPRAZOLE SODIUM 40 MILLIGRAM(S): 40 TABLET, DELAYED RELEASE ORAL at 05:15

## 2024-10-24 RX ADMIN — POLYETHYLENE GLYCOL 3350 17 GRAM(S): 17 POWDER, FOR SOLUTION ORAL at 21:28

## 2024-10-24 RX ADMIN — Medication 40 MILLIGRAM(S): at 18:51

## 2024-10-24 RX ADMIN — LIDOCAINE HYDROCHLORIDE 1 PATCH: 40 SOLUTION TOPICAL at 13:16

## 2024-10-24 RX ADMIN — LIDOCAINE HYDROCHLORIDE 1 PATCH: 40 SOLUTION TOPICAL at 19:35

## 2024-10-24 RX ADMIN — Medication 975 MILLIGRAM(S): at 05:14

## 2024-10-24 RX ADMIN — Medication 975 MILLIGRAM(S): at 23:32

## 2024-10-24 RX ADMIN — OXYCODONE HYDROCHLORIDE 10 MILLIGRAM(S): 30 TABLET ORAL at 16:15

## 2024-10-24 RX ADMIN — Medication 40 MILLIGRAM(S): at 05:15

## 2024-10-24 RX ADMIN — DEXAMETHASONE 1.5 MG 3 MILLIGRAM(S): 1.5 TABLET ORAL at 05:15

## 2024-10-24 RX ADMIN — Medication 975 MILLIGRAM(S): at 19:35

## 2024-10-24 RX ADMIN — VANCOMYCIN HYDROCHLORIDE 250 MILLIGRAM(S): KIT at 02:00

## 2024-10-24 RX ADMIN — DEXAMETHASONE 1.5 MG 2 MILLIGRAM(S): 1.5 TABLET ORAL at 21:33

## 2024-10-24 RX ADMIN — OXYCODONE HYDROCHLORIDE 10 MILLIGRAM(S): 30 TABLET ORAL at 17:15

## 2024-10-24 RX ADMIN — Medication 975 MILLIGRAM(S): at 06:09

## 2024-10-24 NOTE — PROGRESS NOTE ADULT - SUBJECTIVE AND OBJECTIVE BOX
CC: Patient being seen for rehabilitation follow up.  Patient reports his pain is overall improved.  Numbness in the right foot is about the same.   Feels that he is making progress for home.     FUNCTIONAL PROGRESS  10/23 PT  Transfer: Sit to Stand:     · Level of [place]	independent    Transfer: Stand to Sit:     · Level of [place]	independent    Sit/Stand Transfer Safety Analysis:     · Transfer Safety Concerns Noted	LSOd    Gait Skills:     · Level of [place]	150 feet no device modified I  · Brace/Orthotics	LSO  10/23 OT  Bathing Training:     · Level of [place]	minimum assist (75% patients effort)  · Assistive Device	anticipate pt being MI with device; shower chair; long handled sponge    Upper Body Dressing Training:     · Level of [place]	independent    Lower Body Dressing Training:     · Level of [place]	minimum assist (75% patients effort)  · Physical Assist/Nonphysical Assist	pt deferred LB device training states family can assist with needs. Pt educated on compensatory dressing techniques within spinal precautions    Toilet Hygiene Training:     · Level of [place]	independent    Grooming Training:     · Level of [place]	independent    Eating/Self-Feeding Training:     · Level of [place]	independent      VITALS  T(C): 36.6 (10-24-24 @ 16:03), Max: 36.8 (10-23-24 @ 20:17)  HR: 78 (10-24-24 @ 16:03) (66 - 98)  BP: 138/75 (10-24-24 @ 16:03) (119/78 - 150/76)  RR: 17 (10-24-24 @ 16:03) (17 - 18)  SpO2: 98% (10-24-24 @ 16:03) (94% - 98%)  Wt(kg): --    MEDICATIONS   acetaminophen     Tablet .. 975 milliGRAM(s) every 6 hours  cyclobenzaprine 10 milliGRAM(s) every 8 hours PRN  dexAMETHasone     Tablet     dexAMETHasone     Tablet 2 milliGRAM(s) every 8 hours  dextrose 5%. 1000 milliLiter(s) <Continuous>  dextrose 5%. 1000 milliLiter(s) <Continuous>  dextrose 50% Injectable 25 Gram(s) once  dextrose 50% Injectable 12.5 Gram(s) once  dextrose 50% Injectable 25 Gram(s) once  dextrose Oral Gel 15 Gram(s) once PRN  enoxaparin Injectable 40 milliGRAM(s) every 12 hours  glucagon  Injectable 1 milliGRAM(s) once  hydrALAZINE Injectable 10 milliGRAM(s) every 2 hours PRN  HYDROmorphone  Injectable 0.5 milliGRAM(s) every 4 hours PRN  influenza   Vaccine 0.5 milliLiter(s) once  insulin lispro (ADMELOG) corrective regimen sliding scale   three times a day before meals  labetalol Injectable 10 milliGRAM(s) every 2 hours PRN  lidocaine   4% Patch 1 Patch every 24 hours  multivitamin 1 Tablet(s) daily  oxyCODONE    IR 5 milliGRAM(s) every 4 hours PRN  oxyCODONE    IR 10 milliGRAM(s) every 4 hours PRN  pantoprazole    Tablet 40 milliGRAM(s) before breakfast  polyethylene glycol 3350 17 Gram(s) at bedtime  senna 2 Tablet(s) at bedtime  sodium chloride 0.9%. 1000 milliLiter(s) <Continuous>  vancomycin  IVPB 1250 milliGRAM(s) every 8 hours      RECENT LABS/IMAGING  - Reviewed Today                        14.8   10.63 )-----------( 270      ( 24 Oct 2024 06:08 )             44.7     10-24    136  |  100  |  22.9[H]  ----------------------------<  115[H]  4.5   |  24.0  |  0.83    Ca    8.5      24 Oct 2024 06:08  Phos  3.2     10-24  Mg     2.3     10-24        Urinalysis Basic - ( 24 Oct 2024 06:08 )    Color: x / Appearance: x / SG: x / pH: x  Gluc: 115 mg/dL / Ketone: x  / Bili: x / Urobili: x   Blood: x / Protein: x / Nitrite: x   Leuk Esterase: x / RBC: x / WBC x   Sq Epi: x / Non Sq Epi: x / Bacteria: x            CT L SPINE 10/19 - L5-S1 disc osteophyte complex with severe canal stenosis, worse on the right side. Suspected impingement of the exiting right L5 nerve root.    MRI L SPINE 10/19 - L5-S1: Right hemilaminotomy. Disc osteophyte complex with large central/right paracentral/foraminal disc extrusion, resulting in severe spinal canal stenosis and severe right lateral recess stenosis with impingement upon the right descending S1 nerve root. The disc osteophyte complex also narrows the left lateral recess and contacts the left descending S1 nerve root. Bilateral facet arthrosis. Moderate right and mild left neural foraminal stenosis.      ----------------------------------------------------------------------------------------  PHYSICAL EXAM  Constitutional - NAD, Comfortable   Extremities - No C/C/E, No calf tenderness   Neurologic Exam -                    Cognitive - AAO to self, place, date, year, situation     Motor -   No focal deficits      Sensory - Decreased to the right medial aspect of foot/toes  Psychiatric - Mood stable, Affect WNL  ----------------------------------------------------------------------------------------  ASSESSMENT/PLAN  29yMale with functional deficits after progressive lumbar disc herniation/radiculopathy  L5-S1 disc herniation, now s/p L5-S1 PLIF - Decadron  Pain - Tylenol, Flexeril, Dilaudid, Lidoderm  DVT PPX - SCDs, Lovenox  Rehab/Impaired mobility and function - Patient continues to require hospitalization for the above diagnoses and ongoing active management of comorbid complications that are substantially posing a threat to bodily function, functional ability and quality of life.     RECOMMEND - OOB daily, Turn Q2 when needed, HOB >30 degrees    Patient is modified to independent for mobility. Needs some assist with bathing.  Expect patient to progress for DC home.    Will sign off at this time. Thank you for allowing me to be part of your patient's care. Please reconsult PMR for additional rehab recommendations or dispo needs if functional status changes. Discussed the specific management and recommendations above with rehab clinical care team/rehab liaison.

## 2024-10-24 NOTE — PROGRESS NOTE ADULT - ASSESSMENT
ASSESSMENT:  29M s/p L5-S1 PLIF POD#2 seen lying comfortably in bed.    PLAN:    - Q4 neuro checks  - Normotensive  - 2 subfascial LORA drains and 2 subcutaneous LORA drains to full suction, monitor output  - Dressing per plastics   - Vanco while the drains are in  - Pain control prn; standing tylenol, prn: oxy 5/10, dilaudid 0.5 for breakthrough  - Flexeril PRN muscle spasms   - Decadron 5 day taper to off  - Mild uptrending BUN, encouraged fluid intake, will continue IVF for now, will monitor   - Medicine following, reccs appreciated   - Standing AP/Lateral L-spine xrays completed and reviewed   - LSO brace when OOB  - Screening LED negative yesterday, will repeat 10/26 if pt in house  - PT/OT recommending home with outpatient PT  - Bowel regimen: senna, miralax, LBM: 10/23  - Case management to establish home care for drain management   - DVT ppx: SCDs, Lovenox 40mg BID  - Discussed with Dr. Sykes

## 2024-10-24 NOTE — PHARMACOTHERAPY INTERVENTION NOTE - COMMENTS
Increased vancomycin to 1250 mg q8h given subtherapeutic trough of 9.1 mg/L. Ordered level for 10/25 1700 pre 4th dose of new regimen

## 2024-10-24 NOTE — PROGRESS NOTE ADULT - ASSESSMENT
30 y/o male with Hx of right L5-S1 hemilaminectomy for disc herniation with Dr. Sykes (10/2020) and repair of left bicep tendon (10/2023), he came to Emergency Department after experiencing acute on chronic lower back pain with radiation to b/l gluteal muscles and b/l anterior thigh numbness/tingling x 3 days. Patient states symptoms worsen with ambulation and occur intermittently throughout the day, denies any recent trauma, admitted under Neurosurgery team, medicine consulted for Medical management.        29M s/p L5-S1 PLIF POD#2 seen lying comfortably in bed.   mild incisional pain.   has been up and walking around well  - opiate induced constipation regimen   - encouraging incentive spirometry   -DVT prophylaxis and Pain meds as per primary team   -PT/OT and weight bearing per primary team   Patient denies Hx of exertional dyspnea or chest pain, no personal or family hx of easy bleeding, Patient's METS Score is >4 and RCRI is 0 patient labs, EKG reviewed, patient is at low risk for perioperative cardiovascular complication and is optimized from the medicine point of view for planned procedure.     # Leukocytosis  Likely due to steroids  No focus of infection  - Monitor for fever and trend WBC     Flexeril PRN muscle spasms   - Decadron 5 day taper to off    - Mild uptrending BUN, - probably with steroids  creatinine is noraml  encouraged fluid intake, will continue IVF for now, will monitor       # Obesity. BMI 43.4  - Consider Nutrition consult and bariatric consult     Problem/Plan - 1:  ·  Problem: Lumbar herniated disc.      Problem/Plan - 2:  ·  Problem: Severe obesity (BMI >= 40).      Problem/Plan - 3:  ·  Problem: Leukocytosis.

## 2024-10-24 NOTE — PROGRESS NOTE ADULT - SUBJECTIVE AND OBJECTIVE BOX
HPI:  29-year-old male with PSH of right L5-S1 hemilaminectomy for disc herniation with Dr. Sykes (10/2020) and repair of left bicep tendon (10/2023) presents to ED today after experiencing acute on chronic lower back pain with radiation to b/l gluteal muscles and b/l anterior thigh numbness/tingling x 3 days. Patient states symptoms worsen with ambulation and occur intermittently throughout the day, at the worst pain is 7/10 in the back. Patient states he has chronic numbness in right anteriolateral thigh and lateral aspect of right foot. Denies any recent trauma. States that pain in back is described as a tightening pain and was relieved after taking a muscle relaxer yesterday. Denies any epidural steroid injections. Denies any AC/AP use.  (19 Oct 2024 19:06)    INTERVAL HPI/OVERNIGHT EVENTS:  29M s/p L5-S1 PLIF POD#2 seen lying comfortably in bed. Pt reporting mild incisional pain. Reports he has been up and walking around well. Reports new R dorsal medial foot numbness, previous RLE numbness (lateral and posterior calf, and dorsal lateral and plantar aspects of foot) ongoing. Denies any other complaints. LORA outputs: #1: 45cc,#2: 60cc,#3: 25cc,#4: 67cc    Vital Signs Last 24 Hrs  T(C): 36.8 (10-24-24 @ 09:17), Max: 36.8 (10-23-24 @ 13:34)  T(F): 98.3 (10-24-24 @ 09:17), Max: 98.3 (10-23-24 @ 13:34)  HR: 78 (10-24-24 @ 09:17) (66 - 98)  BP: 129/83 (10-24-24 @ 09:17) (119/78 - 150/76)  BP(mean): --  RR: 18 (10-24-24 @ 09:17) (17 - 18)  SpO2: 97% (10-24-24 @ 09:17) (94% - 97%)    PHYSICAL EXAM:  GENERAL: NAD  HEAD: Atraumatic, normocephalic  DRAINS: 2 subfascial LORA drains to full suction, 2 subcutaneous LORA drains to full suction. Serosanguinous drainage noted.  WOUND: Dressing clean dry intact  MENTAL STATUS: AAO x3; Awake; Opens eyes spontaneously; Appropriately conversant without aphasia; following simple commands  CRANIAL NERVES: PERRL. EOMI without nystagmus. Facial sensation intact V1-3 distribution b/l. Face symmetric w/ normal eye closure and smile, tongue midline. Hearing grossly intact. Speech clear.   MOTOR: strength 5/5 b/l upper and lower extremities  SENSATION: Grossly intact to light touch all extremities, ongoing numbness/tingling in R lateral and posterior calf, R dorsal and plantar aspects of foot   CHEST/LUNG: Nonlabored breathing  SKIN: Warm, dry    LABS:                        14.8   10.63 )-----------( 270      ( 24 Oct 2024 06:08 )             44.7     10-24    136  |  100  |  22.9[H]  ----------------------------<  115[H]  4.5   |  24.0  |  0.83    Ca    8.5      24 Oct 2024 06:08  Phos  3.2     10-24  Mg     2.3     10-24        Urinalysis Basic - ( 24 Oct 2024 06:08 )    Color: x / Appearance: x / SG: x / pH: x  Gluc: 115 mg/dL / Ketone: x  / Bili: x / Urobili: x   Blood: x / Protein: x / Nitrite: x   Leuk Esterase: x / RBC: x / WBC x   Sq Epi: x / Non Sq Epi: x / Bacteria: x    RADIOLOGY & ADDITIONAL TESTS:  US Duplex Venous Lower Ext Complete, Bilateral (10.23.24 @ 09:58)   IMPRESSION:  No evidence of deep venous thrombosis in either lower extremity.    CT Lumbar Spine No Cont (10.19.24 @ 18:21)   IMPRESSION:    L5-S1 disc osteophyte complex with severe canal stenosis, worse on the   right side. Suspected impingement of the exiting right L5 nerve root.    MR Lumbar Spine No Cont (10.19.24 @ 15:35)   IMPRESSION:  L5-S1: Right hemilaminotomy. Disc osteophyte complex with large   central/right paracentral/foraminal disc extrusion, resulting in severe   spinal canal stenosis and severe right lateral recess stenosis with   impingement upon the right descending S1 nerve root. The disc osteophyte   complex also narrows the left lateral recess and contacts the left   descending S1 nerve root. Bilateral facet arthrosis. Moderate right and   mild left neural foraminal stenosis.

## 2024-10-24 NOTE — PROGRESS NOTE ADULT - SUBJECTIVE AND OBJECTIVE BOX
SINTIA COLLINS Patient is a 29y old  Male who presents with a chief complaint of L5-S1 disc herniation (24 Oct 2024 11:55)     HPI:  29-year-old male with PSH of right L5-S1 hemilaminectomy for disc herniation with Dr. Sykes (10/2020) and repair of left bicep tendon (10/2023) presents to ED today after experiencing acute on chronic lower back pain with radiation to b/l gluteal muscles and b/l anterior thigh numbness/tingling x 3 days. Patient states symptoms worsen with ambulation and occur intermittently throughout the day, at the worst pain is 7/10 in the back. Patient states he has chronic numbness in right anteriolateral thigh and lateral aspect of right foot. Denies any recent trauma. States that pain in back is described as a tightening pain and was relieved after taking a muscle relaxer yesterday. Denies any epidural steroid injections. Denies any AC/AP use.  (19 Oct 2024 19:06)    The patient was seen and evaluated sitting in a recliner- states walked with PT earlier and with nurse to the bathroom  The patient is in no acute distress.  Denied any fever chest pain, palpitations, shortness of breath, abdominal pain, fever, dysuria, cough, edema       I&O's Summary    23 Oct 2024 07:01  -  24 Oct 2024 07:00  --------------------------------------------------------  IN: 950 mL / OUT: 1022 mL / NET: -72 mL    24 Oct 2024 07:01  -  24 Oct 2024 16:42  --------------------------------------------------------  IN: 0 mL / OUT: 40 mL / NET: -40 mL      Allergies    Omnicef (Rash)    Intolerances      HEALTH ISSUES - PROBLEM Dx:  Lumbar herniated disc    Leukocytosis    Severe obesity (BMI >= 40)          PAST MEDICAL & SURGICAL HISTORY:  Lumbar herniated disc      Leukocytosis      Severe obesity (BMI >= 40)      H/O laminectomy      Biceps tendon rupture              Vital Signs Last 24 Hrs  T(C): 36.6 (24 Oct 2024 16:03), Max: 36.8 (23 Oct 2024 20:17)  T(F): 97.8 (24 Oct 2024 16:03), Max: 98.3 (24 Oct 2024 09:17)  HR: 78 (24 Oct 2024 16:03) (66 - 98)  BP: 138/75 (24 Oct 2024 16:03) (119/78 - 150/76)  BP(mean): --  RR: 17 (24 Oct 2024 16:03) (17 - 18)  SpO2: 98% (24 Oct 2024 16:03) (94% - 98%)    Parameters below as of 24 Oct 2024 16:03  Patient On (Oxygen Delivery Method): room air    T(C): 36.6 (10-24-24 @ 16:03), Max: 36.8 (10-23-24 @ 20:17)  HR: 78 (10-24-24 @ 16:03) (66 - 98)  BP: 138/75 (10-24-24 @ 16:03) (119/78 - 150/76)  RR: 17 (10-24-24 @ 16:03) (17 - 18)  SpO2: 98% (10-24-24 @ 16:03) (94% - 98%)  Wt(kg): --    PHYSICAL EXAM:    GENERAL: NAD, conversing pleasant   HEAD:  Atraumatic, Normocephalic  EYES:, conjunctiva and sclera clear  ENMT:  Moist mucous membranes,    NERVOUS SYSTEM:  Alert & Oriented X3,  Moves upper and lower extremities; CNS-II-XII  CHEST/LUNG: Clear to auscultation bilaterally;   HEART: Regular rate and rhythm;   ABDOMEN: obese Soft, Nontender, Nondistended; Bowel sounds present   subfascial LORA drains to full suction, 2 subcutaneous LORA drains to full suction. Serosanguinous drainage noted.  EXTREMITIES:  Peripheral Pulses,   psychiatry- mood and affect appropriate, Insight and judgement intact     acetaminophen     Tablet .. 975 milliGRAM(s) Oral every 6 hours  cyclobenzaprine 10 milliGRAM(s) Oral every 8 hours PRN  dexAMETHasone     Tablet   Oral   dexAMETHasone     Tablet 2 milliGRAM(s) Oral every 8 hours  dextrose 5%. 1000 milliLiter(s) IV Continuous <Continuous>  dextrose 5%. 1000 milliLiter(s) IV Continuous <Continuous>  dextrose 50% Injectable 25 Gram(s) IV Push once  dextrose 50% Injectable 12.5 Gram(s) IV Push once  dextrose 50% Injectable 25 Gram(s) IV Push once  dextrose Oral Gel 15 Gram(s) Oral once PRN  enoxaparin Injectable 40 milliGRAM(s) SubCutaneous every 12 hours  glucagon  Injectable 1 milliGRAM(s) IntraMuscular once  hydrALAZINE Injectable 10 milliGRAM(s) IV Push every 2 hours PRN  HYDROmorphone  Injectable 0.5 milliGRAM(s) IV Push every 4 hours PRN  influenza   Vaccine 0.5 milliLiter(s) IntraMuscular once  insulin lispro (ADMELOG) corrective regimen sliding scale   SubCutaneous three times a day before meals  labetalol Injectable 10 milliGRAM(s) IV Push every 2 hours PRN  lidocaine   4% Patch 1 Patch Transdermal every 24 hours  multivitamin 1 Tablet(s) Oral daily  oxyCODONE    IR 5 milliGRAM(s) Oral every 4 hours PRN  oxyCODONE    IR 10 milliGRAM(s) Oral every 4 hours PRN  pantoprazole    Tablet 40 milliGRAM(s) Oral before breakfast  polyethylene glycol 3350 17 Gram(s) Oral at bedtime  senna 2 Tablet(s) Oral at bedtime  sodium chloride 0.9%. 1000 milliLiter(s) IV Continuous <Continuous>  vancomycin  IVPB 1250 milliGRAM(s) IV Intermittent every 8 hours      LABS:                          14.8   10.63 )-----------( 270      ( 24 Oct 2024 06:08 )             44.7     10-24    136  |  100  |  22.9[H]  ----------------------------<  115[H]  4.5   |  24.0  |  0.83    Ca    8.5      24 Oct 2024 06:08  Phos  3.2     10-24  Mg     2.3     10-24              Urinalysis Basic - ( 24 Oct 2024 06:08 )    Color: x / Appearance: x / SG: x / pH: x  Gluc: 115 mg/dL / Ketone: x  / Bili: x / Urobili: x   Blood: x / Protein: x / Nitrite: x   Leuk Esterase: x / RBC: x / WBC x   Sq Epi: x / Non Sq Epi: x / Bacteria: x      CAPILLARY BLOOD GLUCOSE      POCT Blood Glucose.: 116 mg/dL (24 Oct 2024 16:14)  POCT Blood Glucose.: 118 mg/dL (24 Oct 2024 12:12)  POCT Blood Glucose.: 118 mg/dL (24 Oct 2024 07:43)  POCT Blood Glucose.: 178 mg/dL (23 Oct 2024 21:54)  POCT Blood Glucose.: 112 mg/dL (23 Oct 2024 18:18)      RADIOLOGY & ADDITIONAL TESTS:      Consultant notes reviewed  I independently reviwed all images/ labarotory results /cultures/ telemetry/EKG and my findings are indicated above  and discussed care plan with consultants/nursing/ family /patient

## 2024-10-24 NOTE — PROGRESS NOTE ADULT - SUBJECTIVE AND OBJECTIVE BOX
pod #1  avss  pain issues being addressed  tolerating diet    fabiola output:  45  60  25  67    rotate off of midline every 2 hours  prevena vac intact  iv abx while drains remain  dvt ppx as per protocol  gi ppx  spirometry  activity as per neurosurgery  will follow    A Collins HUNTER

## 2024-10-25 LAB
ALBUMIN SERPL ELPH-MCNC: 3.9 G/DL — SIGNIFICANT CHANGE UP (ref 3.3–5.2)
ALP SERPL-CCNC: 53 U/L — SIGNIFICANT CHANGE UP (ref 40–120)
ALT FLD-CCNC: 58 U/L — HIGH
ANION GAP SERPL CALC-SCNC: 11 MMOL/L — SIGNIFICANT CHANGE UP (ref 5–17)
AST SERPL-CCNC: 16 U/L — SIGNIFICANT CHANGE UP
BILIRUB DIRECT SERPL-MCNC: 0.1 MG/DL — SIGNIFICANT CHANGE UP (ref 0–0.3)
BILIRUB INDIRECT FLD-MCNC: 0.3 MG/DL — SIGNIFICANT CHANGE UP (ref 0.2–1)
BILIRUB SERPL-MCNC: 0.4 MG/DL — SIGNIFICANT CHANGE UP (ref 0.4–2)
BUN SERPL-MCNC: 21.5 MG/DL — HIGH (ref 8–20)
CALCIUM SERPL-MCNC: 8.8 MG/DL — SIGNIFICANT CHANGE UP (ref 8.4–10.5)
CHLORIDE SERPL-SCNC: 103 MMOL/L — SIGNIFICANT CHANGE UP (ref 96–108)
CO2 SERPL-SCNC: 26 MMOL/L — SIGNIFICANT CHANGE UP (ref 22–29)
CREAT SERPL-MCNC: 0.74 MG/DL — SIGNIFICANT CHANGE UP (ref 0.5–1.3)
EGFR: 126 ML/MIN/1.73M2 — SIGNIFICANT CHANGE UP
GLUCOSE BLDC GLUCOMTR-MCNC: 110 MG/DL — HIGH (ref 70–99)
GLUCOSE BLDC GLUCOMTR-MCNC: 130 MG/DL — HIGH (ref 70–99)
GLUCOSE BLDC GLUCOMTR-MCNC: 159 MG/DL — HIGH (ref 70–99)
GLUCOSE BLDC GLUCOMTR-MCNC: 97 MG/DL — SIGNIFICANT CHANGE UP (ref 70–99)
GLUCOSE SERPL-MCNC: 126 MG/DL — HIGH (ref 70–99)
HCT VFR BLD CALC: 44.4 % — SIGNIFICANT CHANGE UP (ref 39–50)
HGB BLD-MCNC: 15.2 G/DL — SIGNIFICANT CHANGE UP (ref 13–17)
MAGNESIUM SERPL-MCNC: 2.3 MG/DL — SIGNIFICANT CHANGE UP (ref 1.6–2.6)
MCHC RBC-ENTMCNC: 29.5 PG — SIGNIFICANT CHANGE UP (ref 27–34)
MCHC RBC-ENTMCNC: 34.2 GM/DL — SIGNIFICANT CHANGE UP (ref 32–36)
MCV RBC AUTO: 86.2 FL — SIGNIFICANT CHANGE UP (ref 80–100)
PHOSPHATE SERPL-MCNC: 3 MG/DL — SIGNIFICANT CHANGE UP (ref 2.4–4.7)
PLATELET # BLD AUTO: 272 K/UL — SIGNIFICANT CHANGE UP (ref 150–400)
POTASSIUM SERPL-MCNC: 4.6 MMOL/L — SIGNIFICANT CHANGE UP (ref 3.5–5.3)
POTASSIUM SERPL-SCNC: 4.6 MMOL/L — SIGNIFICANT CHANGE UP (ref 3.5–5.3)
PROT SERPL-MCNC: 6.6 G/DL — SIGNIFICANT CHANGE UP (ref 6.6–8.7)
RBC # BLD: 5.15 M/UL — SIGNIFICANT CHANGE UP (ref 4.2–5.8)
RBC # FLD: 13.2 % — SIGNIFICANT CHANGE UP (ref 10.3–14.5)
SODIUM SERPL-SCNC: 140 MMOL/L — SIGNIFICANT CHANGE UP (ref 135–145)
VANCOMYCIN FLD-MCNC: 11.2 UG/ML — SIGNIFICANT CHANGE UP
WBC # BLD: 9.72 K/UL — SIGNIFICANT CHANGE UP (ref 3.8–10.5)
WBC # FLD AUTO: 9.72 K/UL — SIGNIFICANT CHANGE UP (ref 3.8–10.5)

## 2024-10-25 PROCEDURE — 99232 SBSQ HOSP IP/OBS MODERATE 35: CPT

## 2024-10-25 RX ADMIN — POLYETHYLENE GLYCOL 3350 17 GRAM(S): 17 POWDER, FOR SOLUTION ORAL at 21:52

## 2024-10-25 RX ADMIN — VANCOMYCIN HYDROCHLORIDE 166.67 MILLIGRAM(S): KIT at 09:27

## 2024-10-25 RX ADMIN — OXYCODONE HYDROCHLORIDE 10 MILLIGRAM(S): 30 TABLET ORAL at 22:56

## 2024-10-25 RX ADMIN — VANCOMYCIN HYDROCHLORIDE 166.67 MILLIGRAM(S): KIT at 02:09

## 2024-10-25 RX ADMIN — DEXAMETHASONE 1.5 MG 2 MILLIGRAM(S): 1.5 TABLET ORAL at 05:15

## 2024-10-25 RX ADMIN — OXYCODONE HYDROCHLORIDE 10 MILLIGRAM(S): 30 TABLET ORAL at 16:17

## 2024-10-25 RX ADMIN — Medication 975 MILLIGRAM(S): at 12:38

## 2024-10-25 RX ADMIN — VANCOMYCIN HYDROCHLORIDE 166.67 MILLIGRAM(S): KIT at 18:48

## 2024-10-25 RX ADMIN — Medication 40 MILLIGRAM(S): at 17:15

## 2024-10-25 RX ADMIN — Medication 1 TABLET(S): at 12:38

## 2024-10-25 RX ADMIN — DEXAMETHASONE 1.5 MG 2 MILLIGRAM(S): 1.5 TABLET ORAL at 17:14

## 2024-10-25 RX ADMIN — OXYCODONE HYDROCHLORIDE 10 MILLIGRAM(S): 30 TABLET ORAL at 21:56

## 2024-10-25 RX ADMIN — OXYCODONE HYDROCHLORIDE 10 MILLIGRAM(S): 30 TABLET ORAL at 17:17

## 2024-10-25 RX ADMIN — Medication 975 MILLIGRAM(S): at 00:32

## 2024-10-25 RX ADMIN — Medication 975 MILLIGRAM(S): at 17:15

## 2024-10-25 RX ADMIN — Medication 975 MILLIGRAM(S): at 05:15

## 2024-10-25 RX ADMIN — Medication 975 MILLIGRAM(S): at 06:15

## 2024-10-25 RX ADMIN — Medication 975 MILLIGRAM(S): at 18:05

## 2024-10-25 RX ADMIN — LIDOCAINE HYDROCHLORIDE 1 PATCH: 40 SOLUTION TOPICAL at 03:27

## 2024-10-25 RX ADMIN — OXYCODONE HYDROCHLORIDE 10 MILLIGRAM(S): 30 TABLET ORAL at 06:20

## 2024-10-25 RX ADMIN — CYCLOBENZAPRINE HYDROCHLORIDE 10 MILLIGRAM(S): 30 CAPSULE, EXTENDED RELEASE ORAL at 20:37

## 2024-10-25 RX ADMIN — OXYCODONE HYDROCHLORIDE 10 MILLIGRAM(S): 30 TABLET ORAL at 10:26

## 2024-10-25 RX ADMIN — OXYCODONE HYDROCHLORIDE 10 MILLIGRAM(S): 30 TABLET ORAL at 09:26

## 2024-10-25 RX ADMIN — Medication 975 MILLIGRAM(S): at 23:16

## 2024-10-25 RX ADMIN — Medication 975 MILLIGRAM(S): at 13:38

## 2024-10-25 RX ADMIN — Medication 40 MILLIGRAM(S): at 05:15

## 2024-10-25 RX ADMIN — OXYCODONE HYDROCHLORIDE 10 MILLIGRAM(S): 30 TABLET ORAL at 05:20

## 2024-10-25 RX ADMIN — Medication 2 TABLET(S): at 21:52

## 2024-10-25 RX ADMIN — PANTOPRAZOLE SODIUM 40 MILLIGRAM(S): 40 TABLET, DELAYED RELEASE ORAL at 05:16

## 2024-10-25 NOTE — PROGRESS NOTE ADULT - ASSESSMENT
29M s/p L5-S1 PLIF POD#2 seen lying comfortably in bed.    PLAN:  - Q4 neuro checks  - Normotensive  - 2 subfascial OLRA drains and 2 subcutaneous LORA drains to full suction, monitor output  - Dressing per plastics - Prevena removed today  - Vanco while the drains are in  - Pain control prn; standing tylenol, prn: oxy 5/10, dilaudid 0.5 for breakthrough  - Flexeril PRN muscle spasms   - Decadron 5 day taper to off  - Mild uptrending BUN, encouraged fluid intake, will continue IVF for now, will monitor   - Medicine following, reccs appreciated   - Standing AP/Lateral L-spine xrays completed and reviewed   - LSO brace when OOB  - Screening LED negative yesterday, will repeat 10/26 if pt in house  - PT/OT recommending home with outpatient PT  - Bowel regimen: senna, miralax, LBM: 10/23  - Case management to establish home care for drain management   - DVT ppx: SCDs, Lovenox 40mg BID  - Discussed with Dr. Sykes

## 2024-10-25 NOTE — DIETITIAN INITIAL EVALUATION ADULT - FEEDING SKILL
independent Xeltraceez Pregnancy And Lactation Text: This medication is Pregnancy Category D and is not considered safe during pregnancy.  The risk during breast feeding is also uncertain.

## 2024-10-25 NOTE — PROGRESS NOTE ADULT - SUBJECTIVE AND OBJECTIVE BOX
SINTIA COLLINS Patient is a 29y old  Male who presents with a chief complaint of Other herniation of intervertebral disc of lumbar spine     (25 Oct 2024 12:02)     HPI:  29-year-old male with PSH of right L5-S1 hemilaminectomy for disc herniation with Dr. Sykes (10/2020) and repair of left bicep tendon (10/2023) presents to ED today after experiencing acute on chronic lower back pain with radiation to b/l gluteal muscles and b/l anterior thigh numbness/tingling x 3 days. Patient states symptoms worsen with ambulation and occur intermittently throughout the day, at the worst pain is 7/10 in the back. Patient states he has chronic numbness in right anteriolateral thigh and lateral aspect of right foot. Denies any recent trauma. States that pain in back is described as a tightening pain and was relieved after taking a muscle relaxer yesterday. Denies any epidural steroid injections. Denies any AC/AP use.  (19 Oct 2024 19:06)    The patient was seen and evaluated lying in a recliner - watching something on his laptop - with headphones- offers no complaints - pleasant conversing - states he was told he will go home tomorrow   The patient is in no acute distress.  Denied any fever chest pain, palpitations, shortness of breath, abdominal pain, fever, dysuria, cough, edema       I&O's Summary    24 Oct 2024 07:01  -  25 Oct 2024 07:00  --------------------------------------------------------  IN: 310 mL / OUT: 115 mL / NET: 195 mL    25 Oct 2024 07:01  -  25 Oct 2024 15:47  --------------------------------------------------------  IN: 0 mL / OUT: 53 mL / NET: -53 mL      Allergies    Omnicef (Rash)    Intolerances      HEALTH ISSUES - PROBLEM Dx:  Lumbar herniated disc    Leukocytosis    Severe obesity (BMI >= 40)          PAST MEDICAL & SURGICAL HISTORY:  Lumbar herniated disc      Leukocytosis      Severe obesity (BMI >= 40)      H/O laminectomy      Biceps tendon rupture              Vital Signs Last 24 Hrs  T(C): 36.9 (25 Oct 2024 13:22), Max: 36.9 (25 Oct 2024 13:22)  T(F): 98.4 (25 Oct 2024 13:22), Max: 98.4 (25 Oct 2024 13:22)  HR: 71 (25 Oct 2024 13:22) (66 - 80)  BP: 131/71 (25 Oct 2024 13:22) (121/77 - 141/77)  BP(mean): --  RR: 18 (25 Oct 2024 13:22) (17 - 18)  SpO2: 96% (25 Oct 2024 13:22) (95% - 98%)    Parameters below as of 25 Oct 2024 13:22  Patient On (Oxygen Delivery Method): room air    T(C): 36.9 (10-25-24 @ 13:22), Max: 36.9 (10-25-24 @ 13:22)  HR: 71 (10-25-24 @ 13:22) (66 - 80)  BP: 131/71 (10-25-24 @ 13:22) (121/77 - 141/77)  RR: 18 (10-25-24 @ 13:22) (17 - 18)  SpO2: 96% (10-25-24 @ 13:22) (95% - 98%)  Wt(kg): --    PHYSICAL EXAM:    GENERAL: NAD pleasant   HEAD:  Atraumatic, Normocephalic  EYES: EOMI, PERRL, conjunctiva and sclera clear  ENMT:  Moist mucous membranes,  No lesions  NECK: Supple  NERVOUS SYSTEM:  Alert & Oriented X3,  Moves upper and lower extremities; CNS-II-XII  CHEST/LUNG: Clear to auscultation bilaterally; No rales, rhonchi, wheezing,   HEART: Regular rate and rhythm; No murmurs,   ABDOMEN: Soft, Nontender, Nondistended; Bowel sounds present  EXTREMITIES:  Peripheral Pulses,   psychiatry- mood and affect appropriate, Insight and judgement intact     acetaminophen     Tablet .. 975 milliGRAM(s) Oral every 6 hours  cyclobenzaprine 10 milliGRAM(s) Oral every 8 hours PRN  dexAMETHasone     Tablet   Oral   dexAMETHasone     Tablet 2 milliGRAM(s) Oral every 12 hours  dextrose 5%. 1000 milliLiter(s) IV Continuous <Continuous>  dextrose 5%. 1000 milliLiter(s) IV Continuous <Continuous>  dextrose 50% Injectable 25 Gram(s) IV Push once  dextrose 50% Injectable 12.5 Gram(s) IV Push once  dextrose 50% Injectable 25 Gram(s) IV Push once  dextrose Oral Gel 15 Gram(s) Oral once PRN  enoxaparin Injectable 40 milliGRAM(s) SubCutaneous every 12 hours  glucagon  Injectable 1 milliGRAM(s) IntraMuscular once  hydrALAZINE Injectable 10 milliGRAM(s) IV Push every 2 hours PRN  HYDROmorphone  Injectable 0.5 milliGRAM(s) IV Push every 4 hours PRN  influenza   Vaccine 0.5 milliLiter(s) IntraMuscular once  insulin lispro (ADMELOG) corrective regimen sliding scale   SubCutaneous three times a day before meals  labetalol Injectable 10 milliGRAM(s) IV Push every 2 hours PRN  lidocaine   4% Patch 1 Patch Transdermal every 24 hours  multivitamin 1 Tablet(s) Oral daily  oxyCODONE    IR 10 milliGRAM(s) Oral every 4 hours PRN  oxyCODONE    IR 5 milliGRAM(s) Oral every 4 hours PRN  pantoprazole    Tablet 40 milliGRAM(s) Oral before breakfast  polyethylene glycol 3350 17 Gram(s) Oral at bedtime  senna 2 Tablet(s) Oral at bedtime  vancomycin  IVPB 1250 milliGRAM(s) IV Intermittent every 8 hours      LABS:                          15.2   9.72  )-----------( 272      ( 25 Oct 2024 07:01 )             44.4     10-25    140  |  103  |  21.5[H]  ----------------------------<  126[H]  4.6   |  26.0  |  0.74    Ca    8.8      25 Oct 2024 07:01  Phos  3.0     10-25  Mg     2.3     10-25    TPro  6.6  /  Alb  3.9  /  TBili  0.4  /  DBili  0.1  /  AST  16  /  ALT  58[H]  /  AlkPhos  53  10-25    LIVER FUNCTIONS - ( 25 Oct 2024 07:01 )  Alb: 3.9 g/dL / Pro: 6.6 g/dL / ALK PHOS: 53 U/L / ALT: 58 U/L / AST: 16 U/L / GGT: x                 Urinalysis Basic - ( 25 Oct 2024 07:01 )    Color: x / Appearance: x / SG: x / pH: x  Gluc: 126 mg/dL / Ketone: x  / Bili: x / Urobili: x   Blood: x / Protein: x / Nitrite: x   Leuk Esterase: x / RBC: x / WBC x   Sq Epi: x / Non Sq Epi: x / Bacteria: x      CAPILLARY BLOOD GLUCOSE      POCT Blood Glucose.: 130 mg/dL (25 Oct 2024 11:30)  POCT Blood Glucose.: 110 mg/dL (25 Oct 2024 07:45)  POCT Blood Glucose.: 134 mg/dL (24 Oct 2024 21:36)  POCT Blood Glucose.: 116 mg/dL (24 Oct 2024 16:14)      RADIOLOGY & ADDITIONAL TESTS:      Consultant notes reviewed  I independently reviwed all images/ labarotory results /cultures/ telemetry/EKG and my findings are indicated above  and discussed care plan with consultants/nursing/ family /patient

## 2024-10-25 NOTE — PROGRESS NOTE ADULT - SUBJECTIVE AND OBJECTIVE BOX
HPI:  29-year-old male with PSH of right L5-S1 hemilaminectomy for disc herniation with Dr. Sykes (10/2020) and repair of left bicep tendon (10/2023) presents to ED today after experiencing acute on chronic lower back pain with radiation to b/l gluteal muscles and b/l anterior thigh numbness/tingling x 3 days. Patient states symptoms worsen with ambulation and occur intermittently throughout the day, at the worst pain is 7/10 in the back. Patient states he has chronic numbness in right anteriolateral thigh and lateral aspect of right foot. Denies any recent trauma. States that pain in back is described as a tightening pain and was relieved after taking a muscle relaxer yesterday. Denies any epidural steroid injections. Denies any AC/AP use.  (19 Oct 2024 19:06)    INTERVAL HPI/OVERNIGHT EVENTS:  29y Male s/p L5-S1 PLIF POD3 seen lying comfortably in bed. Tolerating diet. Passing gas/BM. Voiding. Has some right foot numbness new since surgery.  Denies headache, weakness, numbness, n/v/d, fevers, chills, chest pain, SOB.     Vital Signs Last 24 Hrs  T(C): 36.8 (25 Oct 2024 09:27), Max: 36.8 (25 Oct 2024 09:27)  T(F): 98.2 (25 Oct 2024 09:27), Max: 98.2 (25 Oct 2024 09:27)  HR: 67 (25 Oct 2024 09:27) (66 - 80)  BP: 137/89 (25 Oct 2024 09:27) (121/77 - 141/77)  BP(mean): --  RR: 18 (25 Oct 2024 09:27) (17 - 18)  SpO2: 95% (25 Oct 2024 09:27) (95% - 98%)    Parameters below as of 25 Oct 2024 09:27  Patient On (Oxygen Delivery Method): room air      PHYSICAL EXAM:  GENERAL: NAD  HEAD: Atraumatic, normocephalic  DRAINS: 2 subfascial LORA drains to full suction, 2 subcutaneous LORA drains to full suction. Serosanguinous drainage noted.  WOUND: Dressing clean dry intact  MENTAL STATUS: AAO x3; Awake; Opens eyes spontaneously; Appropriately conversant without aphasia; following simple commands  CRANIAL NERVES: PERRL. EOMI without nystagmus. Facial sensation intact V1-3 distribution b/l. Face symmetric w/ normal eye closure and smile, tongue midline. Hearing grossly intact. Speech clear.   MOTOR: strength 5/5 b/l upper and lower extremities  SENSATION: Grossly intact to light touch all extremities, ongoing numbness/tingling in R lateral and posterior calf, R dorsal and plantar aspects of foot   CHEST/LUNG: Nonlabored breathing  SKIN: Warm, dry      LABS:                        15.2   9.72  )-----------( 272      ( 25 Oct 2024 07:01 )             44.4     10-25    140  |  103  |  21.5[H]  ----------------------------<  126[H]  4.6   |  26.0  |  0.74    Ca    8.8      25 Oct 2024 07:01  Phos  3.0     10-25  Mg     2.3     10-25    TPro  6.6  /  Alb  3.9  /  TBili  0.4  /  DBili  0.1  /  AST  16  /  ALT  58[H]  /  AlkPhos  53  10-25      Urinalysis Basic - ( 25 Oct 2024 07:01 )    Color: x / Appearance: x / SG: x / pH: x  Gluc: 126 mg/dL / Ketone: x  / Bili: x / Urobili: x   Blood: x / Protein: x / Nitrite: x   Leuk Esterase: x / RBC: x / WBC x   Sq Epi: x / Non Sq Epi: x / Bacteria: x        10-24 @ 07:01  -  10-25 @ 07:00  --------------------------------------------------------  IN: 310 mL / OUT: 115 mL / NET: 195 mL

## 2024-10-25 NOTE — PROGRESS NOTE ADULT - ASSESSMENT
28 y/o male with Hx of right L5-S1 hemilaminectomy for disc herniation with Dr. Sykes (10/2020) and repair of left bicep tendon (10/2023), he came to Emergency Department after experiencing acute on chronic lower back pain with radiation to b/l gluteal muscles and b/l anterior thigh numbness/tingling x 3 days. Patient states symptoms worsen with ambulation and occur intermittently throughout the day, denies any recent trauma, admitted under Neurosurgery team, medicine consulted for Medical management.      29M s/p L5-S1 PLIF POD#3seen lying comfortably in bed.    has been up and walking around well  - opiate induced constipation regimen   - encouraging incentive spirometry     -DVT prophylaxis and Pain meds as per primary team   -PT/OT and weight bearing per primary team     # Leukocytosis- improved - no fever   Likely due to steroids     Flexeril PRN muscle spasms    Decadron taper to off    Mild up trending BUN, - probably with steroids  creatinine is normal  encouraged fluid intake, will continue IVF for now, will monitor     # Obesity. BMI 43.4  - Consider Nutrition consult and bariatric consult after recovery     Problem/Plan - 1:  ·  Problem: Lumbar herniated disc.      Problem/Plan - 2:  ·  Problem: Severe obesity (BMI >= 40).      Problem/Plan - 3:  ·  Problem: Leukocytosis.

## 2024-10-25 NOTE — DIETITIAN INITIAL EVALUATION ADULT - PERTINENT LABORATORY DATA
10-25 Na140 mmol/L Glu 126 mg/dL[H] K+ 4.6 mmol/L Cr  0.74 mg/dL BUN 21.5 mg/dL[H] Phos 3.0 mg/dL Alb 3.9 g/dL     POCT Blood Glucose.: 130 mg/dL (10-25-24 @ 11:30)  A1C with Estimated Average Glucose Result: 5.0 % (10-20-24 @ 03:45)

## 2024-10-25 NOTE — DIETITIAN INITIAL EVALUATION ADULT - OTHER INFO
29-year-old male with PSH of right L5-S1 hemilaminectomy for disc herniation with Dr. Sykes (10/2020) and repair of left bicep tendon (10/2023) presents to ED today after experiencing acute on chronic lower back pain with radiation to b/l gluteal muscles and b/l anterior thigh numbness/tingling x 3 days. Patient states symptoms worsen with ambulation and occur intermittently throughout the day, at the worst pain is 7/10 in the back. Patient states he has chronic numbness in right anteriolateral thigh and lateral aspect of right foot. Denies any recent trauma. States that pain in back is described as a tightening pain and was relieved after taking a muscle relaxer yesterday. Denies any epidural steroid injections. Denies any AC/AP use.  (19 Oct 2024 19:06)

## 2024-10-25 NOTE — DIETITIAN INITIAL EVALUATION ADULT - ORAL INTAKE PTA/DIET HISTORY
Patient tolerating regular diet well with good appetite/PO intake. Pt reports eating 100% of all meals since admission. Pt states PTA his appetite was also good. States he was eating about 3 meals/day and following no diet restrictions. Pt reports planning to start eating healthier when discharged. Pt provided with verbal and written education on general healthy eating. Pt with good understanding. Pt with no c/o N/V/C/D at this time. States his UBW is about 342 lbs, current weight 329.1 lbs. No edema noted. Pt stating that this weight is inaccurate and he has not been losing any weight. Recommend obtaining updated weight when able, pt OOB at this time. RD contact information provided for further nutrition-related questions or concerns. Will continue to monitor and follow up as needed. RD remains available.

## 2024-10-25 NOTE — PROGRESS NOTE ADULT - SUBJECTIVE AND OBJECTIVE BOX
pod #3  avss  pain issues being addressed  tolerating diet    fabiola output:  30  45  15  25    prevena vac removed suture line intact  fabiola milked  iv abx while drains remain  dvt ppx as per protocol  gi ppx  spirometry  activity as per neurosurgery  will follow    A Collins HUNTER

## 2024-10-25 NOTE — DIETITIAN INITIAL EVALUATION ADULT - PERTINENT MEDS FT
MEDICATIONS  (STANDING):  dexAMETHasone     Tablet 2 milliGRAM(s) Oral every 12 hours  dextrose 5%. 1000 milliLiter(s) (50 mL/Hr) IV Continuous <Continuous>  dextrose 50% Injectable 25 Gram(s) IV Push once  enoxaparin Injectable 40 milliGRAM(s) SubCutaneous every 12 hours  glucagon  Injectable 1 milliGRAM(s) IntraMuscular once  insulin lispro (ADMELOG) corrective regimen sliding scale   SubCutaneous three times a day before meals  multivitamin 1 Tablet(s) Oral daily  pantoprazole    Tablet 40 milliGRAM(s) Oral before breakfast  polyethylene glycol 3350 17 Gram(s) Oral at bedtime  senna 2 Tablet(s) Oral at bedtime  vancomycin  IVPB 1250 milliGRAM(s) IV Intermittent every 8 hours    MEDICATIONS  (PRN):  dextrose Oral Gel 15 Gram(s) Oral once PRN Blood Glucose LESS THAN 70 milliGRAM(s)/deciliter  oxyCODONE    IR 10 milliGRAM(s) Oral every 4 hours PRN Severe Pain (7 - 10)

## 2024-10-25 NOTE — DIETITIAN INITIAL EVALUATION ADULT - ADD RECOMMEND
1) Continue diet as tolerated.   2) Encourage po intake, monitor diet tolerance, and provide assistance at meals as needed.   3) Rx: MVI daily.   4) Obtain weekly weights to monitor trends.

## 2024-10-26 LAB
ANION GAP SERPL CALC-SCNC: 9 MMOL/L — SIGNIFICANT CHANGE UP (ref 5–17)
BUN SERPL-MCNC: 20.8 MG/DL — HIGH (ref 8–20)
CALCIUM SERPL-MCNC: 8.6 MG/DL — SIGNIFICANT CHANGE UP (ref 8.4–10.5)
CHLORIDE SERPL-SCNC: 101 MMOL/L — SIGNIFICANT CHANGE UP (ref 96–108)
CO2 SERPL-SCNC: 27 MMOL/L — SIGNIFICANT CHANGE UP (ref 22–29)
CREAT SERPL-MCNC: 0.79 MG/DL — SIGNIFICANT CHANGE UP (ref 0.5–1.3)
EGFR: 123 ML/MIN/1.73M2 — SIGNIFICANT CHANGE UP
GLUCOSE BLDC GLUCOMTR-MCNC: 117 MG/DL — HIGH (ref 70–99)
GLUCOSE BLDC GLUCOMTR-MCNC: 129 MG/DL — HIGH (ref 70–99)
GLUCOSE BLDC GLUCOMTR-MCNC: 94 MG/DL — SIGNIFICANT CHANGE UP (ref 70–99)
GLUCOSE SERPL-MCNC: 109 MG/DL — HIGH (ref 70–99)
HCT VFR BLD CALC: 42.1 % — SIGNIFICANT CHANGE UP (ref 39–50)
HGB BLD-MCNC: 14 G/DL — SIGNIFICANT CHANGE UP (ref 13–17)
MCHC RBC-ENTMCNC: 28.7 PG — SIGNIFICANT CHANGE UP (ref 27–34)
MCHC RBC-ENTMCNC: 33.3 GM/DL — SIGNIFICANT CHANGE UP (ref 32–36)
MCV RBC AUTO: 86.4 FL — SIGNIFICANT CHANGE UP (ref 80–100)
PLATELET # BLD AUTO: 267 K/UL — SIGNIFICANT CHANGE UP (ref 150–400)
POTASSIUM SERPL-MCNC: 4.3 MMOL/L — SIGNIFICANT CHANGE UP (ref 3.5–5.3)
POTASSIUM SERPL-SCNC: 4.3 MMOL/L — SIGNIFICANT CHANGE UP (ref 3.5–5.3)
RBC # BLD: 4.87 M/UL — SIGNIFICANT CHANGE UP (ref 4.2–5.8)
RBC # FLD: 13.2 % — SIGNIFICANT CHANGE UP (ref 10.3–14.5)
SODIUM SERPL-SCNC: 137 MMOL/L — SIGNIFICANT CHANGE UP (ref 135–145)
WBC # BLD: 11.05 K/UL — HIGH (ref 3.8–10.5)
WBC # FLD AUTO: 11.05 K/UL — HIGH (ref 3.8–10.5)

## 2024-10-26 PROCEDURE — 99232 SBSQ HOSP IP/OBS MODERATE 35: CPT

## 2024-10-26 RX ADMIN — Medication 975 MILLIGRAM(S): at 19:04

## 2024-10-26 RX ADMIN — Medication 975 MILLIGRAM(S): at 18:04

## 2024-10-26 RX ADMIN — OXYCODONE HYDROCHLORIDE 5 MILLIGRAM(S): 30 TABLET ORAL at 12:29

## 2024-10-26 RX ADMIN — VANCOMYCIN HYDROCHLORIDE 166.67 MILLIGRAM(S): KIT at 18:05

## 2024-10-26 RX ADMIN — VANCOMYCIN HYDROCHLORIDE 166.67 MILLIGRAM(S): KIT at 02:18

## 2024-10-26 RX ADMIN — Medication 975 MILLIGRAM(S): at 23:06

## 2024-10-26 RX ADMIN — OXYCODONE HYDROCHLORIDE 5 MILLIGRAM(S): 30 TABLET ORAL at 11:29

## 2024-10-26 RX ADMIN — Medication 975 MILLIGRAM(S): at 00:16

## 2024-10-26 RX ADMIN — CYCLOBENZAPRINE HYDROCHLORIDE 10 MILLIGRAM(S): 30 CAPSULE, EXTENDED RELEASE ORAL at 21:19

## 2024-10-26 RX ADMIN — Medication 40 MILLIGRAM(S): at 05:50

## 2024-10-26 RX ADMIN — Medication 975 MILLIGRAM(S): at 06:43

## 2024-10-26 RX ADMIN — Medication 40 MILLIGRAM(S): at 18:04

## 2024-10-26 RX ADMIN — VANCOMYCIN HYDROCHLORIDE 166.67 MILLIGRAM(S): KIT at 11:29

## 2024-10-26 RX ADMIN — DEXAMETHASONE 1.5 MG 1 MILLIGRAM(S): 1.5 TABLET ORAL at 18:13

## 2024-10-26 RX ADMIN — Medication 2 TABLET(S): at 21:16

## 2024-10-26 RX ADMIN — Medication 975 MILLIGRAM(S): at 05:50

## 2024-10-26 RX ADMIN — Medication 1 TABLET(S): at 11:28

## 2024-10-26 RX ADMIN — DEXAMETHASONE 1.5 MG 2 MILLIGRAM(S): 1.5 TABLET ORAL at 05:50

## 2024-10-26 RX ADMIN — PANTOPRAZOLE SODIUM 40 MILLIGRAM(S): 40 TABLET, DELAYED RELEASE ORAL at 05:50

## 2024-10-26 RX ADMIN — Medication 975 MILLIGRAM(S): at 11:28

## 2024-10-26 RX ADMIN — Medication 975 MILLIGRAM(S): at 12:28

## 2024-10-26 NOTE — PROGRESS NOTE ADULT - SUBJECTIVE AND OBJECTIVE BOX
HPI:  29-year-old male with PSH of right L5-S1 hemilaminectomy for disc herniation with Dr. Sykes (10/2020) and repair of left bicep tendon (10/2023) presents to ED today after experiencing acute on chronic lower back pain with radiation to b/l gluteal muscles and b/l anterior thigh numbness/tingling x 3 days. Patient states symptoms worsen with ambulation and occur intermittently throughout the day, at the worst pain is 7/10 in the back. Patient states he has chronic numbness in right anteriolateral thigh and lateral aspect of right foot. Denies any recent trauma. States that pain in back is described as a tightening pain and was relieved after taking a muscle relaxer yesterday. Denies any epidural steroid injections. Denies any AC/AP use.  (19 Oct 2024 19:06)      INTERVAL HPI/OVERNIGHT EVENTS:  29y Male s/p L5-S1 PLIF POD#3 seen lying comfortably in bed. Tolerating diet. Passing gas/BM. Voiding. Denies headache, weakness, numbness, n/v/d, fevers, chills, chest pain, SOB.     Vital Signs Last 24 Hrs  T(C): 36.8 (26 Oct 2024 14:17), Max: 36.8 (25 Oct 2024 20:35)  T(F): 98.2 (26 Oct 2024 14:17), Max: 98.2 (25 Oct 2024 20:35)  HR: 86 (26 Oct 2024 14:17) (72 - 87)  BP: 124/84 (26 Oct 2024 14:17) (124/84 - 149/76)  BP(mean): --  RR: 18 (26 Oct 2024 14:17) (17 - 18)  SpO2: 96% (26 Oct 2024 14:17) (95% - 97%)    Parameters below as of 26 Oct 2024 14:17  Patient On (Oxygen Delivery Method): room air        PHYSICAL EXAM:  GENERAL: NAD, well-groomed  HEAD:  Atraumatic, normocephalic  DRAINS:   2: 5/20 subfascial  3: 10/25  submuscular  4: 25/53 subQ  WOUND: Dressing clean dry intact  TINO COMA SCORE: E- V- M- = 15  MENTAL STATUS: AAO x3; Awake; Opens eyes spontaneously; Appropriately conversant without aphasia; following simple commands  CRANIAL NERVES: PERRL. EOMI without nystagmus. Facial sensation intact V1-3 distribution b/l. Face symmetric w/ normal eye closure and smile, tongue midline. Hearing grossly intact. Speech clear. Head turning and shoulder shrug intact.   MOTOR: strength 5/5 b/l upper and lower extremities  Uppers     Delt (C5/6)     Bicep (C5/6)     Wrist Extend (C6)     Tricep (C7)     HG (C8/T1)  R                     5/5                 5/5                         5/5                           5/5                   5/5  L                      5/5                 5/5                         5/5                           5/5                   5/5  Lowers      HF(L1/L2)     KE (L3)     DF (L4)     EHL (L5)     PF (S1)      R                     5/5              5/5           5/5           5/5            5/5  L                     5/5               5/5          5/5            5/5            5/5  SENSATION: LLE numbness from buttock to foot, otherwise grossly intact to light touch all extremities  COORDINATION: Gait intact; rapid alternating movements intact; heel to shin intact; no upper extremity dysmetria  CHEST/LUNG: equal bilateral chest rise, no accessory muscle usage, no cough  ABDOMEN: Soft, nontender, nondistended  EXTREMITIES:  2+ peripheral pulses, no clubbing, cyanosis, or edema  SKIN: Warm, dry; no rashes or lesions    LABS:                        14.0   11.05 )-----------( 267      ( 26 Oct 2024 05:10 )             42.1     10-26    137  |  101  |  20.8[H]  ----------------------------<  109[H]  4.3   |  27.0  |  0.79    Ca    8.6      26 Oct 2024 05:10  Phos  3.0     10-25  Mg     2.3     10-25    TPro  6.6  /  Alb  3.9  /  TBili  0.4  /  DBili  0.1  /  AST  16  /  ALT  58[H]  /  AlkPhos  53  10-25      Urinalysis Basic - ( 26 Oct 2024 05:10 )    Color: x / Appearance: x / SG: x / pH: x  Gluc: 109 mg/dL / Ketone: x  / Bili: x / Urobili: x   Blood: x / Protein: x / Nitrite: x   Leuk Esterase: x / RBC: x / WBC x   Sq Epi: x / Non Sq Epi: x / Bacteria: x        10-25 @ 07:01  -  10-26 @ 07:00  --------------------------------------------------------  IN: 300 mL / OUT: 1325 mL / NET: -1025 mL    10-26 @ 07:01  -  10-26 @ 16:23  --------------------------------------------------------  IN: 360 mL / OUT: 20 mL / NET: 340 mL        RADIOLOGY & ADDITIONAL TESTS:

## 2024-10-26 NOTE — PROGRESS NOTE ADULT - ASSESSMENT
28 y/o male with Hx of right L5-S1 hemilaminectomy for disc herniation with Dr. Sykes (10/2020) and repair of left bicep tendon (10/2023), he came to Emergency Department after experiencing acute on chronic lower back pain with radiation to b/l gluteal muscles and b/l anterior thigh numbness/tingling x 3 days. Patient states symptoms worsen with ambulation and occur intermittently throughout the day, denies any recent trauma, admitted under Neurosurgery team, medicine consulted for Medical management.      Lumbar herniated disc   s/p L5-S1 PLIF   - ambulating with PT   - opiate induced constipation regimen   - encouraging incentive spirometry   - DVT prophylaxis and Pain meds as per primary team   - PT/OT and weight bearing per primary team   - Decadron taper to off     Leukocytosis   - no fever   - Likely due to steroids     Obesity. BMI 43.4   - Consider Nutrition consult and bariatric consult after recovery

## 2024-10-26 NOTE — PROGRESS NOTE ADULT - SUBJECTIVE AND OBJECTIVE BOX
pod #4  avss  pain issues being addressed  tolerating diet    fabiola output:  27  20  25  53    prevena vac removed suture line intact  fabiola milked  iv abx while drains remain  dvt ppx as per protocol  gi ppx  spirometry  activity as per neurosurgery  will follow    A Collins HUNTER

## 2024-10-26 NOTE — PROGRESS NOTE ADULT - SUBJECTIVE AND OBJECTIVE BOX
[facility] Division of Hospital Medicine     Chief Complaint:  herniation of intervertebral disc of lumbar spine     SUBJECTIVE / OVERNIGHT EVENTS:   Pt reports no complaints     Patient denies chest pain, SOB, abd pain, N/V, fever, chills, dysuria or any other complaints. All remainder ROS negative.     MEDICATIONS  (STANDING):  acetaminophen     Tablet .. 975 milliGRAM(s) Oral every 6 hours  dexAMETHasone     Tablet   Oral   dexAMETHasone     Tablet 1 milliGRAM(s) Oral every 12 hours  dextrose 5%. 1000 milliLiter(s) (100 mL/Hr) IV Continuous <Continuous>  dextrose 5%. 1000 milliLiter(s) (50 mL/Hr) IV Continuous <Continuous>  dextrose 50% Injectable 25 Gram(s) IV Push once  dextrose 50% Injectable 12.5 Gram(s) IV Push once  dextrose 50% Injectable 25 Gram(s) IV Push once  enoxaparin Injectable 40 milliGRAM(s) SubCutaneous every 12 hours  glucagon  Injectable 1 milliGRAM(s) IntraMuscular once  influenza   Vaccine 0.5 milliLiter(s) IntraMuscular once  insulin lispro (ADMELOG) corrective regimen sliding scale   SubCutaneous three times a day before meals  lidocaine   4% Patch 1 Patch Transdermal every 24 hours  multivitamin 1 Tablet(s) Oral daily  pantoprazole    Tablet 40 milliGRAM(s) Oral before breakfast  polyethylene glycol 3350 17 Gram(s) Oral at bedtime  senna 2 Tablet(s) Oral at bedtime  vancomycin  IVPB 1250 milliGRAM(s) IV Intermittent every 8 hours    MEDICATIONS  (PRN):  cyclobenzaprine 10 milliGRAM(s) Oral every 8 hours PRN Muscle Spasm  dextrose Oral Gel 15 Gram(s) Oral once PRN Blood Glucose LESS THAN 70 milliGRAM(s)/deciliter  hydrALAZINE Injectable 10 milliGRAM(s) IV Push every 2 hours PRN SBP>160  HYDROmorphone  Injectable 0.5 milliGRAM(s) IV Push every 4 hours PRN Severe Pain (7 - 10)  labetalol Injectable 10 milliGRAM(s) IV Push every 2 hours PRN Systolic blood pressure >160  oxyCODONE    IR 10 milliGRAM(s) Oral every 4 hours PRN Severe Pain (7 - 10)  oxyCODONE    IR 5 milliGRAM(s) Oral every 4 hours PRN Moderate Pain (4 - 6)        I&O's Summary    25 Oct 2024 07:01  -  26 Oct 2024 07:00  --------------------------------------------------------  IN: 300 mL / OUT: 1325 mL / NET: -1025 mL        PHYSICAL EXAM:  Vital Signs Last 24 Hrs  T(C): 36.6 (26 Oct 2024 09:00), Max: 36.8 (25 Oct 2024 20:35)  T(F): 97.9 (26 Oct 2024 09:00), Max: 98.2 (25 Oct 2024 20:35)  HR: 79 (26 Oct 2024 09:00) (72 - 87)  BP: 129/82 (26 Oct 2024 09:00) (128/76 - 149/76)  BP(mean): --  RR: 18 (26 Oct 2024 09:00) (17 - 18)  SpO2: 96% (26 Oct 2024 09:00) (95% - 97%)    Parameters below as of 26 Oct 2024 09:00  Patient On (Oxygen Delivery Method): room air        CONSTITUTIONAL: NAD, sitting up in bed  RESPIRATORY: Normal respiratory effort; lungs are clear to auscultation bilaterally  CARDIOVASCULAR: Regular rate and rhythm, normal S1 and S2   ABDOMEN: Nontender to palpation, normoactive bowel sounds  MUSCULOSKELETAL:  No clubbing or cyanosis of digits   PSYCH: A+O to person, place, and time; affect appropriate  NEUROLOGY: No gross deficits       LABS:                        14.0   11.05 )-----------( 267      ( 26 Oct 2024 05:10 )             42.1     10-26    137  |  101  |  20.8[H]  ----------------------------<  109[H]  4.3   |  27.0  |  0.79    Ca    8.6      26 Oct 2024 05:10  Phos  3.0     10-25  Mg     2.3     10-25    TPro  6.6  /  Alb  3.9  /  TBili  0.4  /  DBili  0.1  /  AST  16  /  ALT  58[H]  /  AlkPhos  53  10-25          Urinalysis Basic - ( 26 Oct 2024 05:10 )    Color: x / Appearance: x / SG: x / pH: x  Gluc: 109 mg/dL / Ketone: x  / Bili: x / Urobili: x   Blood: x / Protein: x / Nitrite: x   Leuk Esterase: x / RBC: x / WBC x   Sq Epi: x / Non Sq Epi: x / Bacteria: x        CAPILLARY BLOOD GLUCOSE      POCT Blood Glucose.: 94 mg/dL (26 Oct 2024 11:22)  POCT Blood Glucose.: 117 mg/dL (26 Oct 2024 08:11)  POCT Blood Glucose.: 159 mg/dL (25 Oct 2024 21:51)  POCT Blood Glucose.: 97 mg/dL (25 Oct 2024 16:59)

## 2024-10-26 NOTE — PROGRESS NOTE ADULT - NS ATTEND OPT1A GEN_ALL_CORE
"BROCK    S: Doing well. Good movement. No ctx, lof, vb. Denies headaches. Does note increased swelling in hands and feet. Improves with walking and after rest. Continues to note snoring, was not present prior to pregnancy.  here and does not ever hear any signs of apnea. Does feel like she was more congested, increased mucuous last week.     O: /68   Pulse 110   Ht 1.625 m (5' 3.98\")   Wt 77.1 kg (170 lb)   LMP 2023 (Exact Date)   BMI 29.20 kg/m      A/P: 35 year old  @32w1d   Swelling: BP normal today and weight stable. Will continue to monitor. Advised to remove rings as getting hard to take off. Preeclampsia warning signs discussed.   Snoring: Will try antihistamine to see if helps and positioning with sleep.  will monitor for any signs of apnea.   Reviewed weight gain today  RTC 2 weeks    Tomeka Salazar MD    " Medical decision making

## 2024-10-26 NOTE — PROGRESS NOTE ADULT - ASSESSMENT
· Assessment	  29M s/p L5-S1 PLIF POD#2 seen lying comfortably in bed.  10/26: drain 1 was not holding suction and removed today    PLAN:  - Q4 neuro checks  - Normotensive  - 1 subfascial LORA drains and 2 subcutaneous LORA drains to full suction, monitor output  - Dressing per plastics - Prevena removed today  - Vanco while the drains are in  - Pain control prn; standing tylenol, prn: oxy 5/10, dilaudid 0.5 for breakthrough  - Flexeril PRN muscle spasms   - Decadron 5 day taper to off  - Mild uptrending BUN, encouraged fluid intake, will continue IVF for now, will monitor   - Medicine following, reccs appreciated   - Standing AP/Lateral L-spine xrays completed and reviewed   - LSO brace when OOB  - Screening LED negative yesterday, will repeat 10/26 if pt in house  - PT/OT recommending home with outpatient PT  - Bowel regimen: senna, miralax, LBM: 10/23  - Case management to establish home care for drain management   - DVT ppx: SCDs, Lovenox 40mg BID  - Discussed with Dr. Sykes and Dr. Stoll

## 2024-10-27 VITALS
DIASTOLIC BLOOD PRESSURE: 85 MMHG | OXYGEN SATURATION: 96 % | TEMPERATURE: 98 F | SYSTOLIC BLOOD PRESSURE: 127 MMHG | HEART RATE: 100 BPM | RESPIRATION RATE: 18 BRPM

## 2024-10-27 LAB
ANION GAP SERPL CALC-SCNC: 12 MMOL/L — SIGNIFICANT CHANGE UP (ref 5–17)
BUN SERPL-MCNC: 19.3 MG/DL — SIGNIFICANT CHANGE UP (ref 8–20)
CALCIUM SERPL-MCNC: 8.7 MG/DL — SIGNIFICANT CHANGE UP (ref 8.4–10.5)
CHLORIDE SERPL-SCNC: 99 MMOL/L — SIGNIFICANT CHANGE UP (ref 96–108)
CO2 SERPL-SCNC: 27 MMOL/L — SIGNIFICANT CHANGE UP (ref 22–29)
CREAT SERPL-MCNC: 0.82 MG/DL — SIGNIFICANT CHANGE UP (ref 0.5–1.3)
EGFR: 122 ML/MIN/1.73M2 — SIGNIFICANT CHANGE UP
GLUCOSE BLDC GLUCOMTR-MCNC: 102 MG/DL — HIGH (ref 70–99)
GLUCOSE BLDC GLUCOMTR-MCNC: 109 MG/DL — HIGH (ref 70–99)
GLUCOSE BLDC GLUCOMTR-MCNC: 112 MG/DL — HIGH (ref 70–99)
GLUCOSE SERPL-MCNC: 99 MG/DL — SIGNIFICANT CHANGE UP (ref 70–99)
HCT VFR BLD CALC: 42.8 % — SIGNIFICANT CHANGE UP (ref 39–50)
HGB BLD-MCNC: 14.6 G/DL — SIGNIFICANT CHANGE UP (ref 13–17)
MAGNESIUM SERPL-MCNC: 2 MG/DL — SIGNIFICANT CHANGE UP (ref 1.6–2.6)
MCHC RBC-ENTMCNC: 28.7 PG — SIGNIFICANT CHANGE UP (ref 27–34)
MCHC RBC-ENTMCNC: 34.1 GM/DL — SIGNIFICANT CHANGE UP (ref 32–36)
MCV RBC AUTO: 84.1 FL — SIGNIFICANT CHANGE UP (ref 80–100)
PHOSPHATE SERPL-MCNC: 3.5 MG/DL — SIGNIFICANT CHANGE UP (ref 2.4–4.7)
PLATELET # BLD AUTO: 266 K/UL — SIGNIFICANT CHANGE UP (ref 150–400)
POTASSIUM SERPL-MCNC: 4.6 MMOL/L — SIGNIFICANT CHANGE UP (ref 3.5–5.3)
POTASSIUM SERPL-SCNC: 4.6 MMOL/L — SIGNIFICANT CHANGE UP (ref 3.5–5.3)
RBC # BLD: 5.09 M/UL — SIGNIFICANT CHANGE UP (ref 4.2–5.8)
RBC # FLD: 13.2 % — SIGNIFICANT CHANGE UP (ref 10.3–14.5)
SODIUM SERPL-SCNC: 138 MMOL/L — SIGNIFICANT CHANGE UP (ref 135–145)
WBC # BLD: 12.4 K/UL — HIGH (ref 3.8–10.5)
WBC # FLD AUTO: 12.4 K/UL — HIGH (ref 3.8–10.5)

## 2024-10-27 PROCEDURE — 86850 RBC ANTIBODY SCREEN: CPT

## 2024-10-27 PROCEDURE — 85730 THROMBOPLASTIN TIME PARTIAL: CPT

## 2024-10-27 PROCEDURE — 87640 STAPH A DNA AMP PROBE: CPT

## 2024-10-27 PROCEDURE — 80202 ASSAY OF VANCOMYCIN: CPT

## 2024-10-27 PROCEDURE — 99232 SBSQ HOSP IP/OBS MODERATE 35: CPT

## 2024-10-27 PROCEDURE — 86901 BLOOD TYPING SEROLOGIC RH(D): CPT

## 2024-10-27 PROCEDURE — 71045 X-RAY EXAM CHEST 1 VIEW: CPT

## 2024-10-27 PROCEDURE — 80053 COMPREHEN METABOLIC PANEL: CPT

## 2024-10-27 PROCEDURE — 93970 EXTREMITY STUDY: CPT

## 2024-10-27 PROCEDURE — 72148 MRI LUMBAR SPINE W/O DYE: CPT | Mod: MC

## 2024-10-27 PROCEDURE — 96375 TX/PRO/DX INJ NEW DRUG ADDON: CPT

## 2024-10-27 PROCEDURE — 82962 GLUCOSE BLOOD TEST: CPT

## 2024-10-27 PROCEDURE — 93005 ELECTROCARDIOGRAM TRACING: CPT

## 2024-10-27 PROCEDURE — 93970 EXTREMITY STUDY: CPT | Mod: 26

## 2024-10-27 PROCEDURE — 87641 MR-STAPH DNA AMP PROBE: CPT

## 2024-10-27 PROCEDURE — 85610 PROTHROMBIN TIME: CPT

## 2024-10-27 PROCEDURE — 85025 COMPLETE CBC W/AUTO DIFF WBC: CPT

## 2024-10-27 PROCEDURE — 72131 CT LUMBAR SPINE W/O DYE: CPT | Mod: MC

## 2024-10-27 PROCEDURE — 36415 COLL VENOUS BLD VENIPUNCTURE: CPT

## 2024-10-27 PROCEDURE — C9399: CPT

## 2024-10-27 PROCEDURE — 80076 HEPATIC FUNCTION PANEL: CPT

## 2024-10-27 PROCEDURE — 96374 THER/PROPH/DIAG INJ IV PUSH: CPT

## 2024-10-27 PROCEDURE — 84100 ASSAY OF PHOSPHORUS: CPT

## 2024-10-27 PROCEDURE — 86900 BLOOD TYPING SEROLOGIC ABO: CPT

## 2024-10-27 PROCEDURE — 76000 FLUOROSCOPY <1 HR PHYS/QHP: CPT

## 2024-10-27 PROCEDURE — 93925 LOWER EXTREMITY STUDY: CPT | Mod: 26

## 2024-10-27 PROCEDURE — 80048 BASIC METABOLIC PNL TOTAL CA: CPT

## 2024-10-27 PROCEDURE — 72100 X-RAY EXAM L-S SPINE 2/3 VWS: CPT

## 2024-10-27 PROCEDURE — C1713: CPT

## 2024-10-27 PROCEDURE — 83735 ASSAY OF MAGNESIUM: CPT

## 2024-10-27 PROCEDURE — 83036 HEMOGLOBIN GLYCOSYLATED A1C: CPT

## 2024-10-27 PROCEDURE — C1889: CPT

## 2024-10-27 PROCEDURE — 93925 LOWER EXTREMITY STUDY: CPT

## 2024-10-27 PROCEDURE — 99285 EMERGENCY DEPT VISIT HI MDM: CPT | Mod: 25

## 2024-10-27 PROCEDURE — 85027 COMPLETE CBC AUTOMATED: CPT

## 2024-10-27 RX ORDER — CYCLOBENZAPRINE HYDROCHLORIDE 30 MG/1
1 CAPSULE, EXTENDED RELEASE ORAL
Qty: 15 | Refills: 0
Start: 2024-10-27 | End: 2024-10-31

## 2024-10-27 RX ORDER — AMOXICILLIN AND CLAVULANATE POTASSIUM 600; 42.9 MG/5ML; MG/5ML
875 POWDER, FOR SUSPENSION ORAL
Qty: 10 | Refills: 0
Start: 2024-10-27 | End: 2024-10-31

## 2024-10-27 RX ORDER — OXYCODONE HYDROCHLORIDE 30 MG/1
1 TABLET ORAL
Qty: 28 | Refills: 0
Start: 2024-10-27 | End: 2024-11-02

## 2024-10-27 RX ORDER — METHYLPREDNISOLONE ACETATE 80 MG/ML
1 INJECTION, SUSPENSION INTRALESIONAL; INTRAMUSCULAR; INTRASYNOVIAL; SOFT TISSUE
Qty: 1 | Refills: 0
Start: 2024-10-27 | End: 2024-10-31

## 2024-10-27 RX ORDER — ACETAMINOPHEN 500 MG
3 TABLET ORAL
Qty: 0 | Refills: 0 | DISCHARGE
Start: 2024-10-27

## 2024-10-27 RX ORDER — OXYCODONE HYDROCHLORIDE 30 MG/1
1 TABLET ORAL
Refills: 0
Start: 2024-10-27

## 2024-10-27 RX ORDER — GABAPENTIN 300 MG/1
1 CAPSULE ORAL
Qty: 15 | Refills: 0
Start: 2024-10-27 | End: 2024-10-31

## 2024-10-27 RX ORDER — PANTOPRAZOLE SODIUM 40 MG/1
1 TABLET, DELAYED RELEASE ORAL
Qty: 5 | Refills: 0
Start: 2024-10-27 | End: 2024-10-31

## 2024-10-27 RX ORDER — OXYCODONE HYDROCHLORIDE 30 MG/1
1 TABLET ORAL
Qty: 0 | Refills: 0 | DISCHARGE
Start: 2024-10-27

## 2024-10-27 RX ORDER — SENNA 187 MG
2 TABLET ORAL
Qty: 10 | Refills: 0
Start: 2024-10-27 | End: 2024-10-31

## 2024-10-27 RX ORDER — POLYETHYLENE GLYCOL 3350 17 G/17G
17 POWDER, FOR SOLUTION ORAL
Qty: 85 | Refills: 0
Start: 2024-10-27 | End: 2024-10-31

## 2024-10-27 RX ADMIN — VANCOMYCIN HYDROCHLORIDE 166.67 MILLIGRAM(S): KIT at 02:27

## 2024-10-27 RX ADMIN — Medication 975 MILLIGRAM(S): at 06:27

## 2024-10-27 RX ADMIN — OXYCODONE HYDROCHLORIDE 5 MILLIGRAM(S): 30 TABLET ORAL at 02:37

## 2024-10-27 RX ADMIN — Medication 1 TABLET(S): at 11:27

## 2024-10-27 RX ADMIN — Medication 975 MILLIGRAM(S): at 05:27

## 2024-10-27 RX ADMIN — OXYCODONE HYDROCHLORIDE 5 MILLIGRAM(S): 30 TABLET ORAL at 03:37

## 2024-10-27 RX ADMIN — VANCOMYCIN HYDROCHLORIDE 166.67 MILLIGRAM(S): KIT at 09:04

## 2024-10-27 RX ADMIN — DEXAMETHASONE 1.5 MG 1 MILLIGRAM(S): 1.5 TABLET ORAL at 05:26

## 2024-10-27 RX ADMIN — Medication 40 MILLIGRAM(S): at 05:27

## 2024-10-27 RX ADMIN — Medication 975 MILLIGRAM(S): at 00:06

## 2024-10-27 RX ADMIN — Medication 975 MILLIGRAM(S): at 11:27

## 2024-10-27 RX ADMIN — Medication 975 MILLIGRAM(S): at 12:20

## 2024-10-27 RX ADMIN — PANTOPRAZOLE SODIUM 40 MILLIGRAM(S): 40 TABLET, DELAYED RELEASE ORAL at 05:27

## 2024-10-27 NOTE — CHART NOTE - NSCHARTNOTEFT_GEN_A_CORE
Patient was positioned flat. Drain was clamped/taken off suction. Sterile dressing removed with dried blood noted. Incision clean, dry, intact without drainage or dehiscence noted. Submuscular LORA drain removed slowly with minimal drainage from site. Drain site covered with gauze and tegaderm Patient tolerated procedure well. RN/primary care team aware.    PHYSICAL EXAM:  GENERAL: NAD, well-groomed, well-developed  HEAD:  Atraumatic, normocephalic  DRAINS: 1 subcutaneous and 1 submuscular drain still in place, serosanguinous output  WOUND: Dressing clean dry intact; well healed  TINO COMA SCORE: E- V- M- = 15  MENTAL STATUS: AAO x3; Awake; Opens eyes spontaneously; Appropriately conversant without aphasia; following simple commands  MOTOR: strength 5/5 b/l upper and lower extremities  SENSATION: grossly intact to light touch all extremities with reduced sensation in RLE below the knee down to the foot  EXTREMITIES: no clubbing, cyanosis, or edema  SKIN: Warm, dry; no rashes or lesions

## 2024-10-27 NOTE — PROGRESS NOTE ADULT - REASON FOR ADMISSION
L5-S1 PLIF
Other herniation of intervertebral disc of lumbar spine
Other herniation of intervertebral disc of lumbar spine
L5-S1 disc herniation
elective lumbar surgery
L5-S1 disc herniation
L5-S1 disc herniation

## 2024-10-27 NOTE — DISCHARGE NOTE NURSING/CASE MANAGEMENT/SOCIAL WORK - NSDCPEFALRISK_GEN_ALL_CORE
For information on Fall & Injury Prevention, visit: [email or web address] OR  [email or web address] OR  https://www.cdc.gov/steadi/patient.html

## 2024-10-27 NOTE — DISCHARGE NOTE PROVIDER - CARE PROVIDER_API CALL
Teo Sykes  Neurosurgery  [address], [address]  [place] [address]  Phone: [phone]  Fax: [phone]  Follow Up Time:     Gualberto Guadalupe  Plastic Surgery  [address], [address]  [place] [address]  Phone: [phone]  Fax: [phone]  Follow Up Time:

## 2024-10-27 NOTE — DISCHARGE NOTE PROVIDER - HOSPITAL COURSE
30 y/o male admitted to hospital on DATE underwent L5-S1 PLIF on 10/22/2024 by Dr. Sykes. Patient tolerated procedure well and progressed appropriately. Currently tolerating regular diet, voiding independently, having bowel movements and ambulating. Neurosurgery, medicine, physical therapy and occupational therapy followed the patient's course. On DATE, pt deemed medically and surgically stable for discharge. Discharged with home medications, incentive spirometer and pain medications to follow-up with Dr. Sykes and Dr. Coreas. Instructions, medications, and hospital course was discussed with patient and/or family prior to discharge. 28 y/o male admitted to hospital on 10/19 underwent L5-S1 PLIF on 10/22/2024 by Dr. Sykes. Patient tolerated procedure well and progressed appropriately. Currently tolerating regular diet, voiding independently, having bowel movements and ambulating. Neurosurgery, medicine, physical therapy and occupational therapy followed the patient's course. On 10/27, pt deemed medically and surgically stable for discharge. Discharged with home medications, incentive spirometer and pain medications to follow-up with Dr. Sykes and Dr. Coreas. Instructions, medications, and hospital course was discussed with patient and/or family prior to discharge. 28 y/o male admitted to hospital on 10/19 underwent L5-S1 PLIF on 10/22/2024 by Dr. Sykes. Patient tolerated procedure well and progressed appropriately. Currently tolerating regular diet, voiding independently, having bowel movements and ambulating. Neurosurgery, medicine, physical therapy and occupational therapy followed the patient's course. On 10/27, pt deemed medically and surgically stable for discharge. Discharged with home medications, incentive spirometer and pain medications to follow-up with Dr. Sykes and Dr. Coreas. Pt being discharged with 2 drains still in place. Instructions, medications, and hospital course was discussed with patient and/or family prior to discharge.

## 2024-10-27 NOTE — DISCHARGE NOTE PROVIDER - NSDCMRMEDTOKEN_GEN_ALL_CORE_FT
multivitamin: 1 tab(s) orally once a day   acetaminophen 325 mg oral tablet: 3 tab(s) orally every 6 hours  amoxicillin-clavulanate 875 mg-125 mg oral tablet: 875 milligram(s) orally 2 times a day  cyclobenzaprine 10 mg oral tablet: 1 tab(s) orally every 8 hours as needed for Muscle Spasm  gabapentin 300 mg oral capsule: 1 cap(s) orally 3 times a day  Medrol Dosepak 4 mg oral tablet: 1 dose(s) orally once a day  multivitamin: 1 tab(s) orally once a day  oxyCODONE 5 mg oral tablet: 1 tab(s) orally every 4 hours As needed Moderate Pain (4 - 6)  pantoprazole 40 mg oral delayed release tablet: 1 tab(s) orally once a day (before a meal)  polyethylene glycol 3350 oral powder for reconstitution: 17 gram(s) orally once a day (at bedtime)  senna leaf extract oral tablet: 2 tab(s) orally once a day (at bedtime)   acetaminophen 325 mg oral tablet: 3 tab(s) orally every 6 hours  amoxicillin-clavulanate 875 mg-125 mg oral tablet: 875 milligram(s) orally 2 times a day  cyclobenzaprine 10 mg oral tablet: 1 tab(s) orally every 8 hours as needed for Muscle Spasm  gabapentin 300 mg oral capsule: 1 cap(s) orally 3 times a day  Medrol Dosepak 4 mg oral tablet: 1 dose(s) orally once a day  multivitamin: 1 tab(s) orally once a day  oxyCODONE 5 mg oral tablet: 1 tab(s) orally every 4 hours As needed Moderate Pain (4 - 6)  oxyCODONE 5 mg oral tablet: 1 tab(s) orally every 6 hours Take 1 tab for moderate pain (4-6) or 2 tab(s) for severe pain (7-10) every 6 hours MDD: 8 tabs  pantoprazole 40 mg oral delayed release tablet: 1 tab(s) orally once a day (before a meal)  polyethylene glycol 3350 oral powder for reconstitution: 17 gram(s) orally once a day (at bedtime)  senna leaf extract oral tablet: 2 tab(s) orally once a day (at bedtime)

## 2024-10-27 NOTE — PROGRESS NOTE ADULT - PROVIDER SPECIALTY LIST ADULT
Neurosurgery
Orthopedics
Plastic Surgery
Hospitalist
Neurosurgery
Neurosurgery
Physiatry
Plastic Surgery
Hospitalist
Neurology
Neurosurgery

## 2024-10-27 NOTE — PROGRESS NOTE ADULT - ASSESSMENT
30 y/o male with Hx of right L5-S1 hemilaminectomy for disc herniation with Dr. Sykes (10/2020) and repair of left bicep tendon (10/2023), he came to Emergency Department after experiencing acute on chronic lower back pain with radiation to b/l gluteal muscles and b/l anterior thigh numbness/tingling x 3 days. Patient states symptoms worsen with ambulation and occur intermittently throughout the day, denies any recent trauma, admitted under Neurosurgery team, medicine consulted for Medical management.      Lumbar herniated disc   s/p L5-S1 PLIF   - ambulating with PT   - opiate induced constipation regimen   - encourage incentive spirometry   - DVT prophylaxis and Pain meds as per primary team   - PT/OT and weight bearing per primary team   - Decadron taper to off     Leukocytosis   - no fever   - Likely due to steroids     Obesity. BMI 43.4   - Consider Nutrition consult and bariatric consult after recovery     DC planning per primary team

## 2024-10-27 NOTE — DISCHARGE NOTE PROVIDER - NSDCFUADDINST_GEN_ALL_CORE_FT
Appointment(s): Follow up with Dr. Sykes's and Dr. Coreas's office for post operative appointments. [phone]   Please call Dr. Hernandez office to report and discuss drainage amounts from drains.     Wear the brace when out of bed and as instructed.   Use incentive spirometer as directed.   Please call the office of you develop any shortness of breath, chest pain, increased pain, fevers/chills.  No heavy lifting/straining, Showering allowed, Stairs allowed, Walking - Indoors allowed, Walking - Outdoors allowed, Follow Instructions Provided by your Surgical Team    Please hold aspirin, NSAIDs, goody's powder, anticoagulation, vitamin E, turmeric/hall lattes, cinnamon pills, fish oil, ginseng, and all other supplements until cleared by your neurosurgeon on an outpatient follow up appointment.     Pain: It is common to experience incisional pain due to surgical approach. For mild to moderate pain, you may take over the counter acetaminophen/Tylenol. Do not take more than 4000mg of acetaminophen/Tylenol in 24 hours. For severe pain, follow instructions for pain medication that was sent to her pharmacy. If you are taking a opiate pain medication, please take colace or miralax to avoid constipation.     Activity: Avoid straining, heavy lifting (objects greater than 10 pounds), strenuous activity, twisting, bending, and vigorous exercise. You should not drive or operate machinery until cleared by your neurosurgeon.     Wound: Monitor your incision for drainage, redness, swelling. Call our office if you have any concerns. While washing, do not rub, scratch, or scrub your incision.   Wound care instructions:   1. Apply bacitracin to suture line  2. Cover with xeroform then tegaderm  3. Re apply white topi foam for padding on either side of the surgical incision  4. Secure topi form with medpore adhesive    Should you develop any new or worsening neurologic symptoms or have concern about your incision, please call our office immediately or visit the emergency department.  Return to ER immediately for any of the following: fever, bleeding, new onset numbness/tingling/weakness, nausea and/or vomiting, chest pain, shortness of breath, confusion, seizure, altered mental status, urinary and/or fecal incontinence or retention.

## 2024-10-27 NOTE — DISCHARGE NOTE NURSING/CASE MANAGEMENT/SOCIAL WORK - PATIENT PORTAL LINK FT
You can access the FollowMyHealth Patient Portal offered by [facility] by registering at the following website: [email or web address]. By joining [facility]’s FollowMyHealth portal, you will also be able to view your health information using other applications (apps) compatible with our system.

## 2024-10-27 NOTE — DISCHARGE NOTE NURSING/CASE MANAGEMENT/SOCIAL WORK - FINANCIAL ASSISTANCE
[facility] provides services at a reduced cost to those who are determined to be eligible through [facility]’s financial assistance program. Information regarding [facility]’s financial assistance program can be found by going to [email or web address] or by calling [phone].

## 2024-10-27 NOTE — PROGRESS NOTE ADULT - SUBJECTIVE AND OBJECTIVE BOX
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 All remainder ROS negative.     MEDICATIONS  (STANDING):  acetaminophen     Tablet .. 975 milliGRAM(s) Oral every 6 hours  dexAMETHasone     Tablet   Oral   dexAMETHasone     Tablet 1 milliGRAM(s) Oral daily  dextrose 5%. 1000 milliLiter(s) (50 mL/Hr) IV Continuous <Continuous>  dextrose 5%. 1000 milliLiter(s) (100 mL/Hr) IV Continuous <Continuous>  dextrose 50% Injectable 25 Gram(s) IV Push once  dextrose 50% Injectable 12.5 Gram(s) IV Push once  dextrose 50% Injectable 25 Gram(s) IV Push once  enoxaparin Injectable 40 milliGRAM(s) SubCutaneous every 12 hours  glucagon  Injectable 1 milliGRAM(s) IntraMuscular once  influenza   Vaccine 0.5 milliLiter(s) IntraMuscular once  insulin lispro (ADMELOG) corrective regimen sliding scale   SubCutaneous three times a day before meals  lidocaine   4% Patch 1 Patch Transdermal every 24 hours  multivitamin 1 Tablet(s) Oral daily  pantoprazole    Tablet 40 milliGRAM(s) Oral before breakfast  polyethylene glycol 3350 17 Gram(s) Oral at bedtime  senna 2 Tablet(s) Oral at bedtime  vancomycin  IVPB 1250 milliGRAM(s) IV Intermittent every 8 hours    MEDICATIONS  (PRN):  cyclobenzaprine 10 milliGRAM(s) Oral every 8 hours PRN Muscle Spasm  dextrose Oral Gel 15 Gram(s) Oral once PRN Blood Glucose LESS THAN 70 milliGRAM(s)/deciliter  hydrALAZINE Injectable 10 milliGRAM(s) IV Push every 2 hours PRN SBP>160  HYDROmorphone  Injectable 0.5 milliGRAM(s) IV Push every 4 hours PRN Severe Pain (7 - 10)  labetalol Injectable 10 milliGRAM(s) IV Push every 2 hours PRN Systolic blood pressure >160  oxyCODONE    IR 5 milliGRAM(s) Oral every 4 hours PRN Moderate Pain (4 - 6)  oxyCODONE    IR 10 milliGRAM(s) Oral every 4 hours PRN Severe Pain (7 - 10)        I&O's Summary    26 Oct 2024 07:01  -  27 Oct 2024 07:00  --------------------------------------------------------  IN: 660 mL / OUT: 814 mL / NET: -154 mL        PHYSICAL EXAM:  Vital Signs Last 24 Hrs  T(C): 37.1 (27 Oct 2024 09:29), Max: 37.1 (27 Oct 2024 09:29)  T(F): 98.8 (27 Oct 2024 09:29), Max: 98.8 (27 Oct 2024 09:29)  HR: 73 (27 Oct 2024 09:29) (72 - 86)  BP: 148/86 (27 Oct 2024 09:29) (117/74 - 148/86)  BP(mean): --  RR: 18 (27 Oct 2024 09:29) (18 - 18)  SpO2: 98% (27 Oct 2024 09:29) (95% - 98%)    Parameters below as of 27 Oct 2024 09:29  Patient On (Oxygen Delivery Method): room air        CONSTITUTIONAL: NAD, sitting up in bed  RESPIRATORY: Normal respiratory effort; lungs are clear to auscultation bilaterally  CARDIOVASCULAR: Regular rate and rhythm, normal S1 and S2   ABDOMEN: Nontender to palpation, normoactive bowel sounds  MUSCULOSKELETAL:  No clubbing or cyanosis of digits   PSYCH: A+O to person, place, and time; affect appropriate  NEUROLOGY: No gross deficits       LABS:                        14.6   12.40 )-----------( 266      ( 27 Oct 2024 05:23 )             42.8     10-27    138  |  99  |  19.3  ----------------------------<  99  4.6   |  27.0  |  0.82    Ca    8.7      27 Oct 2024 05:23  Phos  3.5     10-27  Mg     2.0     10-27            Urinalysis Basic - ( 27 Oct 2024 05:23 )    Color: x / Appearance: x / SG: x / pH: x  Gluc: 99 mg/dL / Ketone: x  / Bili: x / Urobili: x   Blood: x / Protein: x / Nitrite: x   Leuk Esterase: x / RBC: x / WBC x   Sq Epi: x / Non Sq Epi: x / Bacteria: x        CAPILLARY BLOOD GLUCOSE      POCT Blood Glucose.: 109 mg/dL (27 Oct 2024 11:26)  POCT Blood Glucose.: 102 mg/dL (27 Oct 2024 07:44)  POCT Blood Glucose.: 112 mg/dL (26 Oct 2024 21:15)  POCT Blood Glucose.: 129 mg/dL (26 Oct 2024 15:51)

## 2024-10-27 NOTE — DISCHARGE NOTE PROVIDER - NSDCCPTREATMENT_GEN_ALL_CORE_FT
PRINCIPAL PROCEDURE  Procedure: Fusion of spine at L5-S1 level using interbody technique  Findings and Treatment:

## 2024-11-09 NOTE — PATIENT PROFILE ADULT - VISION (WITH CORRECTIVE LENSES IF THE PATIENT USUALLY WEARS THEM):
Dat Mccallum is a 81 y.o. male with PMH of HFrEF, NID-T2DM, HTN, HLD, CKD presenting as a transfer patient from [facility], on his day 24 of admission for a now-stabilized NSTEMI and altered mentation. Neurology is consulted for further workup of persistent, worsening AMS with possible L-sided focal neurologic deficits. Patient neurological examination concerning for fluctuating episodic confusion, with catatonia like behavior and left hemineglect.  EEG sig for diffuse encephalopathy but no seizures. MRI Jorge A w/wo unremarkable. Will follow for workup of infectious/autoimmune encephalitis.    Normal vision: sees adequately in most situations; can see medication labels, newsprint

## 2025-01-30 NOTE — BRIEF OPERATIVE NOTE - NSICDXBRIEFPOSTOP_GEN_ALL_CORE_FT
Addended by: CORTNEY DE LA TORRE on: 1/30/2025 11:09 AM     Modules accepted: Orders     POST-OP DIAGNOSIS:  S/P lumbar spinal fusion 22-Oct-2024 14:39:46  Kristin Mckenzie

## 2025-05-12 NOTE — ED ADULT NURSE NOTE - NSFALLUNIVINTERV_ED_ALL_ED
(V5) oriented Bed/Stretcher in lowest position, wheels locked, appropriate side rails in place/Call bell, personal items and telephone in reach/Instruct patient to call for assistance before getting out of bed/chair/stretcher/Non-slip footwear applied when patient is off stretcher/[place] to call system/Physically safe environment - no spills, clutter or unnecessary equipment/Purposeful proactive rounding/Room/bathroom lighting operational, light cord in reach

## (undated) DEVICE — WARMING BLANKET UPPER ADULT

## (undated) DEVICE — SOL IRR POUR NS 0.9% 1000ML

## (undated) DEVICE — DRAPE 3/4 SHEET WITH ADHESIVE 76" X 60"

## (undated) DEVICE — DRAPE XL SHEET 77X98"

## (undated) DEVICE — SUT MONOCRYL 4-0 27" PS-2 UNDYED

## (undated) DEVICE — CATH IV SAFE BC 18G X 1.16" (GREEN)

## (undated) DEVICE — DRAPE TOWEL 1000 SMALL 17" X 11"

## (undated) DEVICE — DRAPE C ARM UNIVERSAL

## (undated) DEVICE — ELCTR AQUAMANTYS BIPOLAR SEALER 6.0

## (undated) DEVICE — MIDAS REX LEGEND MATCH HEAD FLUTED LG BORE 3.0MM X 14CM

## (undated) DEVICE — DRSG TELFA 3 X 4

## (undated) DEVICE — ELCTR BOVIE PENCIL BLADE 10FT

## (undated) DEVICE — PACK NEURO

## (undated) DEVICE — DRAPE 3/4 SHEET 52X76"

## (undated) DEVICE — POSITIONER JACKSON TABLE HEADREST 7", CHEST, HIP, THIGH PADS, ARM CRADLE

## (undated) DEVICE — DRSG TOPIFOAM

## (undated) DEVICE — VENODYNE/SCD SLEEVE CALF MEDIUM

## (undated) DEVICE — ELCTR ROCKER SWITCH PENCIL BLUE 10FT

## (undated) DEVICE — SUT PDS II 1 36" CT

## (undated) DEVICE — POSITIONER FOAM LAMINECTOMY ARM CRADLE (PINK)

## (undated) DEVICE — MIDAS REX MR8 MATCH HEAD FLUTED LG BORE 3MM X 14CM

## (undated) DEVICE — DRSG MEDIPORE DRESS IT 7-7/8 X 11"

## (undated) DEVICE — DRAPE TOWEL BLUE 17" X 24"

## (undated) DEVICE — ELCTR GROUNDING PAD ADULT COVIDIEN

## (undated) DEVICE — SUT PDS II 0 60" CTX

## (undated) DEVICE — POSITIONER FOAM EGG CRATE ULNAR 2PCS (PINK)

## (undated) DEVICE — GLV 7.5 PROTEXIS (WHITE)

## (undated) DEVICE — DRAPE INSTRUMENT POUCH 6.75" X 11"

## (undated) DEVICE — TUBING FOR SMOKE EVACUATOR (PURPLE END)

## (undated) DEVICE — SOL IRR POUR H2O 1000ML

## (undated) DEVICE — MARKING PEN W RULER

## (undated) DEVICE — FRAZIER CONNECTING TUBE 2FT 5MM

## (undated) DEVICE — SUT VICRYL PLUS 2-0 18" CP-2 UNDYED (POP-OFF)